# Patient Record
Sex: MALE | Race: BLACK OR AFRICAN AMERICAN | NOT HISPANIC OR LATINO | Employment: UNEMPLOYED | URBAN - METROPOLITAN AREA
[De-identification: names, ages, dates, MRNs, and addresses within clinical notes are randomized per-mention and may not be internally consistent; named-entity substitution may affect disease eponyms.]

---

## 2017-05-16 ENCOUNTER — HISTORICAL (OUTPATIENT)
Dept: ADMINISTRATIVE | Facility: HOSPITAL | Age: 57
End: 2017-05-16

## 2017-05-16 LAB
APPEARANCE, UA: CLEAR
BACTERIA #/AREA URNS AUTO: ABNORMAL /[HPF]
BILIRUB UR QL STRIP: NEGATIVE
CHOLEST SERPL-MCNC: 160 MG/DL
CHOLEST/HDLC SERPL: 3.5 {RATIO} (ref 0–5)
COLOR UR: YELLOW
GLUCOSE (UA): NORMAL
HAV IGM SERPL QL IA: NONREACTIVE
HBV CORE IGM SERPL QL IA: NONREACTIVE
HBV SURFACE AG SERPL QL IA: NEGATIVE
HCV AB SERPL QL IA: NONREACTIVE
HDLC SERPL-MCNC: 46 MG/DL
HGB UR QL STRIP: NEGATIVE
HIV 1+2 AB+HIV1 P24 AG SERPL QL IA: NONREACTIVE
HYALINE CASTS #/AREA URNS LPF: ABNORMAL /[LPF]
KETONES UR QL STRIP: NEGATIVE
LDLC SERPL CALC-MCNC: 95 MG/DL (ref 0–130)
LEUKOCYTE ESTERASE UR QL STRIP: NEGATIVE
NITRITE UR QL STRIP: NEGATIVE
PH UR STRIP: 5.5 [PH] (ref 4.5–8)
PROT UR QL STRIP: NEGATIVE
PSA SERPL-MCNC: 3.5 NG/ML (ref 0–4)
RBC #/AREA URNS AUTO: ABNORMAL /[HPF]
RPR SER QL: NORMAL
SP GR UR STRIP: 1.02 (ref 1–1.03)
SQUAMOUS #/AREA URNS LPF: ABNORMAL /[LPF]
TRIGL SERPL-MCNC: 94 MG/DL
TSH SERPL-ACNC: 0.66 MIU/L (ref 0.5–5)
UROBILINOGEN UR STRIP-ACNC: NORMAL
VLDLC SERPL CALC-MCNC: 19 MG/DL
WBC #/AREA URNS AUTO: ABNORMAL /HPF

## 2018-01-04 ENCOUNTER — HISTORICAL (OUTPATIENT)
Dept: RADIOLOGY | Facility: HOSPITAL | Age: 58
End: 2018-01-04

## 2018-07-12 ENCOUNTER — HISTORICAL (OUTPATIENT)
Dept: INTERNAL MEDICINE | Facility: CLINIC | Age: 58
End: 2018-07-12

## 2018-07-12 LAB
ABS NEUT (OLG): 7.34 X10(3)/MCL (ref 2.1–9.2)
ALBUMIN SERPL-MCNC: 3.8 GM/DL (ref 3.4–5)
ALBUMIN/GLOB SERPL: 1 RATIO (ref 1–2)
ALP SERPL-CCNC: 71 UNIT/L (ref 45–117)
ALT SERPL-CCNC: 28 UNIT/L (ref 12–78)
APPEARANCE, UA: CLEAR
AST SERPL-CCNC: 21 UNIT/L (ref 15–37)
BACTERIA #/AREA URNS AUTO: ABNORMAL /[HPF]
BASOPHILS # BLD AUTO: 0.12 X10(3)/MCL
BASOPHILS NFR BLD AUTO: 1 %
BILIRUB SERPL-MCNC: 0.3 MG/DL (ref 0.2–1)
BILIRUB UR QL STRIP: NEGATIVE
BILIRUBIN DIRECT+TOT PNL SERPL-MCNC: 0.1 MG/DL
BILIRUBIN DIRECT+TOT PNL SERPL-MCNC: 0.2 MG/DL
BUN SERPL-MCNC: 16 MG/DL (ref 7–18)
CALCIUM SERPL-MCNC: 8.6 MG/DL (ref 8.5–10.1)
CHLORIDE SERPL-SCNC: 110 MMOL/L (ref 98–107)
CHOLEST SERPL-MCNC: 142 MG/DL
CHOLEST/HDLC SERPL: 3.7 {RATIO} (ref 0–5)
CO2 SERPL-SCNC: 23 MMOL/L (ref 21–32)
COLOR UR: YELLOW
CREAT SERPL-MCNC: 1.1 MG/DL (ref 0.6–1.3)
EOSINOPHIL # BLD AUTO: 0.32 X10(3)/MCL
EOSINOPHIL NFR BLD AUTO: 2 %
ERYTHROCYTE [DISTWIDTH] IN BLOOD BY AUTOMATED COUNT: 15.2 % (ref 11.5–14.5)
GLOBULIN SER-MCNC: 3.4 GM/ML (ref 2.3–3.5)
GLUCOSE (UA): NORMAL
GLUCOSE SERPL-MCNC: 95 MG/DL (ref 74–106)
HCT VFR BLD AUTO: 42.4 % (ref 40–51)
HDLC SERPL-MCNC: 38 MG/DL
HGB BLD-MCNC: 14.1 GM/DL (ref 13.5–17.5)
HGB UR QL STRIP: NEGATIVE
HYALINE CASTS #/AREA URNS LPF: ABNORMAL /[LPF]
IMM GRANULOCYTES # BLD AUTO: 0.05 10*3/UL
IMM GRANULOCYTES NFR BLD AUTO: 0 %
KETONES UR QL STRIP: NEGATIVE
LDLC SERPL CALC-MCNC: 81 MG/DL (ref 0–130)
LEUKOCYTE ESTERASE UR QL STRIP: NEGATIVE
LYMPHOCYTES # BLD AUTO: 4.09 X10(3)/MCL
LYMPHOCYTES NFR BLD AUTO: 32 % (ref 13–40)
MCH RBC QN AUTO: 28.9 PG (ref 26–34)
MCHC RBC AUTO-ENTMCNC: 33.3 GM/DL (ref 31–37)
MCV RBC AUTO: 86.9 FL (ref 80–100)
MONOCYTES # BLD AUTO: 0.77 X10(3)/MCL
MONOCYTES NFR BLD AUTO: 6 % (ref 4–12)
NEUTROPHILS # BLD AUTO: 7.34 X10(3)/MCL
NEUTROPHILS NFR BLD AUTO: 58 X10(3)/MCL
NITRITE UR QL STRIP: NEGATIVE
PH UR STRIP: 6.5 [PH] (ref 4.5–8)
PLATELET # BLD AUTO: 138 X10(3)/MCL (ref 130–400)
PMV BLD AUTO: 12.5 FL (ref 7.4–10.4)
POTASSIUM SERPL-SCNC: 3.8 MMOL/L (ref 3.5–5.1)
PROT SERPL-MCNC: 7.2 GM/DL (ref 6.4–8.2)
PROT UR QL STRIP: NEGATIVE
PSA SERPL-MCNC: 5.7 NG/ML
RBC # BLD AUTO: 4.88 X10(6)/MCL (ref 4.5–5.9)
RBC #/AREA URNS AUTO: ABNORMAL /[HPF]
SODIUM SERPL-SCNC: 142 MMOL/L (ref 136–145)
SP GR UR STRIP: 1.02 (ref 1–1.03)
SQUAMOUS #/AREA URNS LPF: ABNORMAL /[LPF]
TRIGL SERPL-MCNC: 113 MG/DL
TSH SERPL-ACNC: 1.11 MIU/L (ref 0.36–3.74)
UROBILINOGEN UR STRIP-ACNC: 4 MG/DL
VLDLC SERPL CALC-MCNC: 23 MG/DL
WBC # SPEC AUTO: 12.7 X10(3)/MCL (ref 4.5–11)
WBC #/AREA URNS AUTO: ABNORMAL /HPF

## 2018-12-20 ENCOUNTER — HISTORICAL (OUTPATIENT)
Dept: ADMINISTRATIVE | Facility: HOSPITAL | Age: 58
End: 2018-12-20

## 2018-12-20 LAB
ABS NEUT (OLG): 6.35 X10(3)/MCL (ref 2.1–9.2)
ALBUMIN SERPL-MCNC: 3.8 GM/DL (ref 3.4–5)
ALBUMIN/GLOB SERPL: 1 RATIO (ref 1–2)
ALP SERPL-CCNC: 72 UNIT/L (ref 45–117)
ALT SERPL-CCNC: 27 UNIT/L (ref 12–78)
APPEARANCE, UA: CLEAR
AST SERPL-CCNC: 20 UNIT/L (ref 15–37)
BACTERIA #/AREA URNS AUTO: ABNORMAL /[HPF]
BASOPHILS # BLD AUTO: 0.12 X10(3)/MCL
BASOPHILS NFR BLD AUTO: 1 %
BILIRUB SERPL-MCNC: 0.4 MG/DL (ref 0.2–1)
BILIRUB UR QL STRIP: NEGATIVE
BILIRUBIN DIRECT+TOT PNL SERPL-MCNC: 0.1 MG/DL
BILIRUBIN DIRECT+TOT PNL SERPL-MCNC: 0.3 MG/DL
BUN SERPL-MCNC: 14 MG/DL (ref 7–18)
CALCIUM SERPL-MCNC: 8.2 MG/DL (ref 8.5–10.1)
CHLORIDE SERPL-SCNC: 104 MMOL/L (ref 98–107)
CK MB SERPL-MCNC: 3.9 NG/ML (ref 1–3.6)
CK SERPL-CCNC: 199 UNIT/L (ref 39–308)
CO2 SERPL-SCNC: 27 MMOL/L (ref 21–32)
COLOR UR: NORMAL
CREAT SERPL-MCNC: 1 MG/DL (ref 0.6–1.3)
EOSINOPHIL # BLD AUTO: 0.23 X10(3)/MCL
EOSINOPHIL NFR BLD AUTO: 2 %
ERYTHROCYTE [DISTWIDTH] IN BLOOD BY AUTOMATED COUNT: 15.9 % (ref 11.5–14.5)
EST. AVERAGE GLUCOSE BLD GHB EST-MCNC: 120 MG/DL
GLOBULIN SER-MCNC: 3.6 GM/ML (ref 2.3–3.5)
GLUCOSE (UA): NORMAL
GLUCOSE SERPL-MCNC: 90 MG/DL (ref 74–106)
HAV IGM SERPL QL IA: NONREACTIVE
HBA1C MFR BLD: 5.8 % (ref 4.2–6.3)
HBV CORE IGM SERPL QL IA: NONREACTIVE
HBV SURFACE AG SERPL QL IA: NEGATIVE
HCT VFR BLD AUTO: 44.7 % (ref 40–51)
HCV AB SERPL QL IA: NONREACTIVE
HGB BLD-MCNC: 14.8 GM/DL (ref 13.5–17.5)
HGB UR QL STRIP: NEGATIVE
HYALINE CASTS #/AREA URNS LPF: ABNORMAL /[LPF]
IMM GRANULOCYTES # BLD AUTO: 0.03 10*3/UL
IMM GRANULOCYTES NFR BLD AUTO: 0 %
KETONES UR QL STRIP: NEGATIVE
LEUKOCYTE ESTERASE UR QL STRIP: NEGATIVE
LYMPHOCYTES # BLD AUTO: 3.9 X10(3)/MCL
LYMPHOCYTES NFR BLD AUTO: 35 % (ref 13–40)
MAGNESIUM SERPL-MCNC: 2.3 MG/DL (ref 1.8–2.4)
MCH RBC QN AUTO: 28.5 PG (ref 26–34)
MCHC RBC AUTO-ENTMCNC: 33.1 GM/DL (ref 31–37)
MCV RBC AUTO: 86 FL (ref 80–100)
MONOCYTES # BLD AUTO: 0.59 X10(3)/MCL
MONOCYTES NFR BLD AUTO: 5 % (ref 4–12)
NEUTROPHILS # BLD AUTO: 6.35 X10(3)/MCL
NEUTROPHILS NFR BLD AUTO: 56 X10(3)/MCL
NITRITE UR QL STRIP: NEGATIVE
PH UR STRIP: 6 [PH] (ref 4.5–8)
PLATELET # BLD AUTO: 139 X10(3)/MCL (ref 130–400)
PMV BLD AUTO: 11.7 FL (ref 7.4–10.4)
POTASSIUM SERPL-SCNC: 3.8 MMOL/L (ref 3.5–5.1)
PROT SERPL-MCNC: 7.4 GM/DL (ref 6.4–8.2)
PROT UR QL STRIP: NEGATIVE
PSA SERPL-MCNC: 5 NG/ML
RBC # BLD AUTO: 5.2 X10(6)/MCL (ref 4.5–5.9)
RBC #/AREA URNS AUTO: ABNORMAL /[HPF]
SODIUM SERPL-SCNC: 138 MMOL/L (ref 136–145)
SP GR UR STRIP: 1.01 (ref 1–1.03)
SQUAMOUS #/AREA URNS LPF: ABNORMAL /[LPF]
TROPONIN I SERPL-MCNC: <0.015 NG/ML (ref 0–0.05)
UROBILINOGEN UR STRIP-ACNC: NORMAL
WBC # SPEC AUTO: 11.2 X10(3)/MCL (ref 4.5–11)
WBC #/AREA URNS AUTO: ABNORMAL /HPF

## 2019-01-17 ENCOUNTER — HISTORICAL (OUTPATIENT)
Dept: RADIOLOGY | Facility: HOSPITAL | Age: 59
End: 2019-01-17

## 2019-06-20 ENCOUNTER — HISTORICAL (OUTPATIENT)
Dept: ADMINISTRATIVE | Facility: HOSPITAL | Age: 59
End: 2019-06-20

## 2019-06-20 LAB
CRP SERPL-MCNC: 0.5 MG/DL
ERYTHROCYTE [SEDIMENTATION RATE] IN BLOOD: 2 MM/HR (ref 0–15)
RHEUMATOID FACT SERPL-ACNC: <10 IU/ML (ref 0–15)
VIT B12 SERPL-MCNC: 813 PG/ML (ref 193–986)

## 2019-08-20 ENCOUNTER — HISTORICAL (OUTPATIENT)
Dept: ENDOSCOPY | Facility: HOSPITAL | Age: 59
End: 2019-08-20

## 2019-08-23 ENCOUNTER — HISTORICAL (OUTPATIENT)
Dept: ADMINISTRATIVE | Facility: HOSPITAL | Age: 59
End: 2019-08-23

## 2019-08-23 LAB
ABS NEUT (OLG): 16.37 X10(3)/MCL
ANISOCYTOSIS BLD QL SMEAR: ABNORMAL
BASOPHILS NFR BLD MANUAL: 0 %
BILIRUB SERPL-MCNC: NORMAL MG/DL
BLOOD URINE, POC: NORMAL
CLARITY, POC UA: CLEAR
COLOR, POC UA: NORMAL
EOSINOPHIL NFR BLD MANUAL: 0 %
ERYTHROCYTE [DISTWIDTH] IN BLOOD BY AUTOMATED COUNT: 15.3 % (ref 11.5–14.5)
GLUCOSE UR QL STRIP: NEGATIVE
GRANULOCYTES NFR BLD MANUAL: 63 % (ref 43–75)
HCT VFR BLD AUTO: 46.4 % (ref 40–51)
HGB BLD-MCNC: 15.3 GM/DL (ref 13.5–17.5)
KETONES UR QL STRIP: NORMAL
LEUKOCYTE EST, POC UA: NEGATIVE
LYMPHOCYTES NFR BLD MANUAL: 17 % (ref 20.5–51.1)
LYMPHOCYTES NFR BLD MANUAL: 6 %
MACROCYTES BLD QL SMEAR: ABNORMAL
MCH RBC QN AUTO: 29.2 PG (ref 26–34)
MCHC RBC AUTO-ENTMCNC: 33 GM/DL (ref 31–37)
MCV RBC AUTO: 88.5 FL (ref 80–100)
MONOCYTES NFR BLD MANUAL: 6 % (ref 2–9)
NEUTS BAND NFR BLD MANUAL: 8 % (ref 0–10)
NITRITE, POC UA: POSITIVE
PH, POC UA: 5.5
PLATELET # BLD AUTO: 134 X10(3)/MCL (ref 130–400)
PLATELET # BLD EST: ADEQUATE 10*3/UL
PMV BLD AUTO: 12.4 FL (ref 7.4–10.4)
POLYCHROMASIA BLD QL SMEAR: ABNORMAL
PROTEIN, POC: NORMAL
RBC # BLD AUTO: 5.24 X10(6)/MCL (ref 4.5–5.9)
RBC MORPH BLD: ABNORMAL
SPECIFIC GRAVITY, POC UA: NORMAL
UROBILINOGEN, POC UA: NORMAL
WBC # SPEC AUTO: 21.9 X10(3)/MCL (ref 4.5–11)

## 2019-08-25 LAB — FINAL CULTURE: NO GROWTH

## 2019-09-30 ENCOUNTER — HISTORICAL (OUTPATIENT)
Dept: ADMINISTRATIVE | Facility: HOSPITAL | Age: 59
End: 2019-09-30

## 2019-09-30 LAB
APPEARANCE, UA: CLEAR
BACTERIA #/AREA URNS AUTO: ABNORMAL /[HPF]
BILIRUB UR QL STRIP: NEGATIVE
COLOR UR: YELLOW
GLUCOSE (UA): NORMAL
HGB UR QL STRIP: NEGATIVE
HYALINE CASTS #/AREA URNS LPF: ABNORMAL /[LPF]
KETONES UR QL STRIP: NEGATIVE
LEUKOCYTE ESTERASE UR QL STRIP: 25 LEU/UL
NITRITE UR QL STRIP: NEGATIVE
PH UR STRIP: 5.5 [PH] (ref 4.5–8)
PROT UR QL STRIP: 10 MG/DL
RBC #/AREA URNS AUTO: ABNORMAL /[HPF]
SP GR UR STRIP: 1.02 (ref 1–1.03)
SQUAMOUS #/AREA URNS LPF: ABNORMAL /[LPF]
UROBILINOGEN UR STRIP-ACNC: NORMAL
WBC #/AREA URNS AUTO: ABNORMAL /HPF

## 2019-10-03 LAB — FINAL CULTURE: NO GROWTH

## 2019-10-14 ENCOUNTER — HISTORICAL (OUTPATIENT)
Dept: RADIOLOGY | Facility: HOSPITAL | Age: 59
End: 2019-10-14

## 2019-11-15 ENCOUNTER — HISTORICAL (OUTPATIENT)
Dept: ADMINISTRATIVE | Facility: HOSPITAL | Age: 59
End: 2019-11-15

## 2019-11-25 ENCOUNTER — HISTORICAL (OUTPATIENT)
Dept: INTERNAL MEDICINE | Facility: CLINIC | Age: 59
End: 2019-11-25

## 2019-11-27 ENCOUNTER — HISTORICAL (OUTPATIENT)
Dept: ADMINISTRATIVE | Facility: HOSPITAL | Age: 59
End: 2019-11-27

## 2020-05-28 ENCOUNTER — HISTORICAL (OUTPATIENT)
Dept: INTERNAL MEDICINE | Facility: CLINIC | Age: 60
End: 2020-05-28

## 2020-05-28 LAB
ABS NEUT (OLG): 7.11 X10(3)/MCL (ref 2.1–9.2)
ALBUMIN SERPL-MCNC: 3.5 GM/DL (ref 3.4–5)
ALBUMIN/GLOB SERPL: 1 RATIO (ref 1.1–2)
ALP SERPL-CCNC: 70 UNIT/L (ref 45–117)
ALT SERPL-CCNC: 30 UNIT/L (ref 12–78)
AMPHET UR QL SCN: NEGATIVE
ANISOCYTOSIS BLD QL SMEAR: NORMAL
AST SERPL-CCNC: 19 UNIT/L (ref 15–37)
BARBITURATE SCN PRESENT UR: NEGATIVE
BASOPHILS NFR BLD MANUAL: 0 %
BENZODIAZ UR QL SCN: NEGATIVE
BILIRUB SERPL-MCNC: 0.2 MG/DL (ref 0.2–1)
BILIRUBIN DIRECT+TOT PNL SERPL-MCNC: <0.1 MG/DL (ref 0–0.2)
BILIRUBIN DIRECT+TOT PNL SERPL-MCNC: ABNORMAL MG/DL
BUN SERPL-MCNC: 18 MG/DL (ref 7–18)
CALCIUM SERPL-MCNC: 8.7 MG/DL (ref 8.5–10.1)
CANNABINOIDS UR QL SCN: NEGATIVE
CHLORIDE SERPL-SCNC: 110 MMOL/L (ref 98–107)
CHOLEST SERPL-MCNC: 152 MG/DL
CHOLEST/HDLC SERPL: 4.1 {RATIO} (ref 0–5)
CO2 SERPL-SCNC: 26 MMOL/L (ref 21–32)
COCAINE UR QL SCN: POSITIVE
CREAT SERPL-MCNC: 1.2 MG/DL (ref 0.6–1.3)
DEPRECATED CALCIDIOL+CALCIFEROL SERPL-MC: 27.2 NG/ML (ref 30–80)
EOSINOPHIL NFR BLD MANUAL: 0 %
ERYTHROCYTE [DISTWIDTH] IN BLOOD BY AUTOMATED COUNT: 16.7 % (ref 11.5–14.5)
EST. AVERAGE GLUCOSE BLD GHB EST-MCNC: 111 MG/DL
GLOBULIN SER-MCNC: 3.6 GM/ML (ref 2.3–3.5)
GLUCOSE SERPL-MCNC: 122 MG/DL (ref 74–106)
GRANULOCYTES NFR BLD MANUAL: 67 % (ref 43–75)
HBA1C MFR BLD: 5.5 % (ref 4.2–6.3)
HCT VFR BLD AUTO: 45.3 % (ref 40–51)
HDLC SERPL-MCNC: 37 MG/DL (ref 40–59)
HGB BLD-MCNC: 14.8 GM/DL (ref 13.5–17.5)
LDLC SERPL CALC-MCNC: 64 MG/DL
LYMPHOCYTES NFR BLD MANUAL: 29 % (ref 20.5–51.1)
MACROCYTES BLD QL SMEAR: NORMAL
MCH RBC QN AUTO: 28.1 PG (ref 26–34)
MCHC RBC AUTO-ENTMCNC: 32.7 GM/DL (ref 31–37)
MCV RBC AUTO: 86.1 FL (ref 80–100)
MONOCYTES NFR BLD MANUAL: 4 % (ref 2–9)
OPIATES UR QL SCN: NEGATIVE
PCP UR QL: NEGATIVE
PH UR STRIP.AUTO: 6 [PH] (ref 5–8)
PLATELET # BLD AUTO: 140 X10(3)/MCL (ref 130–400)
PLATELET # BLD EST: NORMAL 10*3/UL
PMV BLD AUTO: ABNORMAL FL (ref 7.4–10.4)
POTASSIUM SERPL-SCNC: 3.8 MMOL/L (ref 3.5–5.1)
PROT SERPL-MCNC: 7.1 GM/DL (ref 6.4–8.2)
RBC # BLD AUTO: 5.26 X10(6)/MCL (ref 4.5–5.9)
RBC MORPH BLD: NORMAL
SODIUM SERPL-SCNC: 141 MMOL/L (ref 136–145)
TEMPERATURE, URINE (OHS): 25 DEGC (ref 20–25)
TRIGL SERPL-MCNC: 253 MG/DL
TSH SERPL-ACNC: 1.34 MIU/L (ref 0.36–3.74)
VLDLC SERPL CALC-MCNC: 51 MG/DL
WBC # SPEC AUTO: 13.7 X10(3)/MCL (ref 4.5–11)

## 2020-07-01 ENCOUNTER — HISTORICAL (OUTPATIENT)
Dept: RESPIRATORY THERAPY | Facility: HOSPITAL | Age: 60
End: 2020-07-01

## 2020-07-06 ENCOUNTER — HISTORICAL (OUTPATIENT)
Dept: ADMINISTRATIVE | Facility: HOSPITAL | Age: 60
End: 2020-07-06

## 2020-07-06 LAB — PSA SERPL-MCNC: <0.1 NG/ML

## 2020-09-22 ENCOUNTER — HISTORICAL (OUTPATIENT)
Dept: LAB | Facility: HOSPITAL | Age: 60
End: 2020-09-22

## 2020-09-22 LAB
ABS NEUT (OLG): 6.73 X10(3)/MCL (ref 2.1–9.2)
AMPHET UR QL SCN: NEGATIVE
ANISOCYTOSIS BLD QL SMEAR: ABNORMAL
BARBITURATE SCN PRESENT UR: NEGATIVE
BASOPHILS NFR BLD MANUAL: 0 %
BENZODIAZ UR QL SCN: NEGATIVE
BURR CELLS BLD QL SMEAR: ABNORMAL
CANNABINOIDS UR QL SCN: NEGATIVE
COCAINE UR QL SCN: POSITIVE
EOSINOPHIL NFR BLD MANUAL: 0 %
ERYTHROCYTE [DISTWIDTH] IN BLOOD BY AUTOMATED COUNT: 15.6 % (ref 11.5–14.5)
GRANULOCYTES NFR BLD MANUAL: 57 % (ref 43–75)
HCT VFR BLD AUTO: 43.2 % (ref 40–51)
HGB BLD-MCNC: 14.1 GM/DL (ref 13.5–17.5)
LYMPHOCYTES NFR BLD MANUAL: 30 % (ref 20.5–51.1)
LYMPHOCYTES NFR BLD MANUAL: 6 %
MCH RBC QN AUTO: 28.3 PG (ref 26–34)
MCHC RBC AUTO-ENTMCNC: 32.6 GM/DL (ref 31–37)
MCV RBC AUTO: 86.7 FL (ref 80–100)
MONOCYTES NFR BLD MANUAL: 7 % (ref 2–9)
OPIATES UR QL SCN: NEGATIVE
PCP UR QL: NEGATIVE
PH UR STRIP.AUTO: 5.5 [PH] (ref 5–8)
PLATELET # BLD AUTO: 153 X10(3)/MCL (ref 130–400)
PLATELET # BLD EST: ADEQUATE 10*3/UL
PMV BLD AUTO: 12 FL (ref 7.4–10.4)
POIKILOCYTOSIS BLD QL SMEAR: ABNORMAL
POLYCHROMASIA BLD QL SMEAR: ABNORMAL
RBC # BLD AUTO: 4.98 X10(6)/MCL (ref 4.5–5.9)
RBC MORPH BLD: ABNORMAL
TEMPERATURE, URINE (OHS): 25 DEGC (ref 20–25)
WBC # SPEC AUTO: 12.4 X10(3)/MCL (ref 4.5–11)

## 2020-12-08 ENCOUNTER — HISTORICAL (OUTPATIENT)
Dept: ADMINISTRATIVE | Facility: HOSPITAL | Age: 60
End: 2020-12-08

## 2020-12-08 LAB — SARS-COV-2 RNA RESP QL NAA+PROBE: NOT DETECTED

## 2021-03-24 ENCOUNTER — HISTORICAL (OUTPATIENT)
Dept: INTERNAL MEDICINE | Facility: CLINIC | Age: 61
End: 2021-03-24

## 2021-03-24 LAB
ABS NEUT (OLG): 8.94 X10(3)/MCL (ref 2.1–9.2)
ALBUMIN SERPL-MCNC: 4.2 GM/DL (ref 3.5–5)
ALBUMIN/GLOB SERPL: 1.3 RATIO (ref 1.1–2)
ALP SERPL-CCNC: 75 UNIT/L (ref 40–150)
ALT SERPL-CCNC: 19 UNIT/L (ref 0–55)
AST SERPL-CCNC: 17 UNIT/L (ref 5–34)
BASOPHILS # BLD AUTO: 0.1 X10(3)/MCL (ref 0–0.2)
BASOPHILS NFR BLD AUTO: 1 %
BILIRUB SERPL-MCNC: 0.6 MG/DL
BILIRUBIN DIRECT+TOT PNL SERPL-MCNC: 0.2 MG/DL (ref 0–0.5)
BILIRUBIN DIRECT+TOT PNL SERPL-MCNC: 0.4 MG/DL (ref 0–0.8)
BUN SERPL-MCNC: 10.7 MG/DL (ref 8.4–25.7)
CALCIUM SERPL-MCNC: 9 MG/DL (ref 8.4–10.2)
CHLORIDE SERPL-SCNC: 104 MMOL/L (ref 98–107)
CHOLEST SERPL-MCNC: 151 MG/DL
CHOLEST/HDLC SERPL: 4 {RATIO} (ref 0–5)
CO2 SERPL-SCNC: 24 MMOL/L (ref 22–29)
CREAT SERPL-MCNC: 0.92 MG/DL (ref 0.73–1.18)
CREAT UR-MCNC: 45.3 MG/DL (ref 58–161)
DEPRECATED CALCIDIOL+CALCIFEROL SERPL-MC: 39.2 NG/ML (ref 30–80)
EOSINOPHIL # BLD AUTO: 0.2 X10(3)/MCL (ref 0–0.9)
EOSINOPHIL NFR BLD AUTO: 1 %
ERYTHROCYTE [DISTWIDTH] IN BLOOD BY AUTOMATED COUNT: 15.8 % (ref 11.5–14.5)
EST. AVERAGE GLUCOSE BLD GHB EST-MCNC: 105.4 MG/DL
FOLATE SERPL-MCNC: 11.6 NG/ML (ref 7–31.4)
GLOBULIN SER-MCNC: 3.3 GM/DL (ref 2.4–3.5)
GLUCOSE SERPL-MCNC: 111 MG/DL (ref 74–100)
HAV IGM SERPL QL IA: NONREACTIVE
HBA1C MFR BLD: 5.3 %
HBV CORE IGM SERPL QL IA: NONREACTIVE
HBV SURFACE AG SERPL QL IA: NONREACTIVE
HCT VFR BLD AUTO: 44.6 % (ref 40–51)
HCV AB SERPL QL IA: NONREACTIVE
HDLC SERPL-MCNC: 40 MG/DL (ref 35–60)
HGB BLD-MCNC: 14.8 GM/DL (ref 13.5–17.5)
HIV 1+2 AB+HIV1 P24 AG SERPL QL IA: NONREACTIVE
IMM GRANULOCYTES # BLD AUTO: 0.05 10*3/UL
IMM GRANULOCYTES NFR BLD AUTO: 0 %
LDLC SERPL CALC-MCNC: 90 MG/DL (ref 50–140)
LYMPHOCYTES # BLD AUTO: 3.4 X10(3)/MCL (ref 0.6–4.6)
LYMPHOCYTES NFR BLD AUTO: 26 %
MCH RBC QN AUTO: 28.5 PG (ref 26–34)
MCHC RBC AUTO-ENTMCNC: 33.2 GM/DL (ref 31–37)
MCV RBC AUTO: 85.8 FL (ref 80–100)
MICROALBUMIN UR-MCNC: 7 MG/L
MICROALBUMIN/CREAT RATIO PNL UR: 15.5 MG/GM CR (ref 0–30)
MONOCYTES # BLD AUTO: 0.7 X10(3)/MCL (ref 0.1–1.3)
MONOCYTES NFR BLD AUTO: 5 %
NEUTROPHILS # BLD AUTO: 8.94 X10(3)/MCL (ref 2.1–9.2)
NEUTROPHILS NFR BLD AUTO: 67 %
PLATELET # BLD AUTO: 160 X10(3)/MCL (ref 130–400)
PMV BLD AUTO: 11.9 FL (ref 7.4–10.4)
POTASSIUM SERPL-SCNC: 3.4 MMOL/L (ref 3.5–5.1)
PROT SERPL-MCNC: 7.5 GM/DL (ref 6.4–8.3)
RBC # BLD AUTO: 5.2 X10(6)/MCL (ref 4.5–5.9)
SODIUM SERPL-SCNC: 137 MMOL/L (ref 136–145)
T PALLIDUM AB SER QL: NONREACTIVE
TRIGL SERPL-MCNC: 104 MG/DL (ref 34–140)
VIT B12 SERPL-MCNC: 876 PG/ML (ref 213–816)
VLDLC SERPL CALC-MCNC: 21 MG/DL
WBC # SPEC AUTO: 13.4 X10(3)/MCL (ref 4.5–11)

## 2021-03-25 ENCOUNTER — HISTORICAL (OUTPATIENT)
Dept: ADMINISTRATIVE | Facility: HOSPITAL | Age: 61
End: 2021-03-25

## 2021-08-27 ENCOUNTER — HISTORICAL (OUTPATIENT)
Dept: WOUND CARE | Facility: HOSPITAL | Age: 61
End: 2021-08-27

## 2021-08-27 LAB — PSA SERPL-MCNC: <0.02 NG/ML

## 2021-10-19 ENCOUNTER — HISTORICAL (OUTPATIENT)
Dept: INTERNAL MEDICINE | Facility: CLINIC | Age: 61
End: 2021-10-19

## 2021-10-19 LAB
ABS NEUT (OLG): 6.01 X10(3)/MCL (ref 2.1–9.2)
ALBUMIN SERPL-MCNC: 3.9 GM/DL (ref 3.5–5)
ALBUMIN/GLOB SERPL: 1.2 RATIO (ref 1.1–2)
ALP SERPL-CCNC: 65 UNIT/L (ref 40–150)
ALT SERPL-CCNC: 17 UNIT/L (ref 0–55)
APPEARANCE, UA: CLEAR
AST SERPL-CCNC: 16 UNIT/L (ref 5–34)
BACTERIA #/AREA URNS AUTO: ABNORMAL /HPF
BASOPHILS # BLD AUTO: 0.1 X10(3)/MCL (ref 0–0.2)
BASOPHILS NFR BLD AUTO: 1 %
BILIRUB SERPL-MCNC: 0.4 MG/DL
BILIRUB UR QL STRIP: NEGATIVE
BILIRUBIN DIRECT+TOT PNL SERPL-MCNC: 0.1 MG/DL (ref 0–0.5)
BILIRUBIN DIRECT+TOT PNL SERPL-MCNC: 0.3 MG/DL (ref 0–0.8)
BUN SERPL-MCNC: 13.6 MG/DL (ref 8.4–25.7)
CALCIUM SERPL-MCNC: 9.4 MG/DL (ref 8.4–10.2)
CHLORIDE SERPL-SCNC: 108 MMOL/L (ref 98–107)
CHOLEST SERPL-MCNC: 169 MG/DL
CHOLEST/HDLC SERPL: 5 {RATIO} (ref 0–5)
CO2 SERPL-SCNC: 23 MMOL/L (ref 22–29)
COLOR UR: YELLOW
CREAT SERPL-MCNC: 0.91 MG/DL (ref 0.73–1.18)
DEPRECATED CALCIDIOL+CALCIFEROL SERPL-MC: 32.9 NG/ML (ref 30–80)
EOSINOPHIL # BLD AUTO: 0.2 X10(3)/MCL (ref 0–0.9)
EOSINOPHIL NFR BLD AUTO: 2 %
ERYTHROCYTE [DISTWIDTH] IN BLOOD BY AUTOMATED COUNT: 15.8 % (ref 11.5–14.5)
EST. AVERAGE GLUCOSE BLD GHB EST-MCNC: 114 MG/DL
GLOBULIN SER-MCNC: 3.3 GM/DL (ref 2.4–3.5)
GLUCOSE (UA): NEGATIVE
GLUCOSE SERPL-MCNC: 99 MG/DL (ref 74–100)
HBA1C MFR BLD: 5.6 %
HCT VFR BLD AUTO: 45.8 % (ref 40–51)
HDLC SERPL-MCNC: 36 MG/DL (ref 35–60)
HGB BLD-MCNC: 14.8 GM/DL (ref 13.5–17.5)
HGB UR QL STRIP: NEGATIVE
HYALINE CASTS #/AREA URNS LPF: ABNORMAL /LPF
IMM GRANULOCYTES # BLD AUTO: 0.03 10*3/UL
IMM GRANULOCYTES NFR BLD AUTO: 0 %
KETONES UR QL STRIP: NEGATIVE
LDLC SERPL CALC-MCNC: 114 MG/DL (ref 50–140)
LEUKOCYTE ESTERASE UR QL STRIP: NEGATIVE
LYMPHOCYTES # BLD AUTO: 3.1 X10(3)/MCL (ref 0.6–4.6)
LYMPHOCYTES NFR BLD AUTO: 31 %
MCH RBC QN AUTO: 28.6 PG (ref 26–34)
MCHC RBC AUTO-ENTMCNC: 32.3 GM/DL (ref 31–37)
MCV RBC AUTO: 88.6 FL (ref 80–100)
MONOCYTES # BLD AUTO: 0.7 X10(3)/MCL (ref 0.1–1.3)
MONOCYTES NFR BLD AUTO: 7 %
NEUTROPHILS # BLD AUTO: 6.01 X10(3)/MCL (ref 2.1–9.2)
NEUTROPHILS NFR BLD AUTO: 59 %
NITRITE UR QL STRIP: NEGATIVE
NRBC BLD AUTO-RTO: 0 % (ref 0–0.2)
PH UR STRIP: 6 [PH] (ref 4.5–8)
PLATELET # BLD AUTO: 162 X10(3)/MCL (ref 130–400)
PMV BLD AUTO: 12.6 FL (ref 7.4–10.4)
POTASSIUM SERPL-SCNC: 4.1 MMOL/L (ref 3.5–5.1)
PROT SERPL-MCNC: 7.2 GM/DL (ref 6.4–8.3)
PROT UR QL STRIP: NEGATIVE
RBC # BLD AUTO: 5.17 X10(6)/MCL (ref 4.5–5.9)
RBC #/AREA URNS AUTO: ABNORMAL /HPF
SODIUM SERPL-SCNC: 139 MMOL/L (ref 136–145)
SP GR UR STRIP: 1.01 (ref 1–1.03)
SQUAMOUS #/AREA URNS LPF: >100 /LPF
T4 FREE SERPL-MCNC: 0.88 NG/DL (ref 0.7–1.48)
TRIGL SERPL-MCNC: 95 MG/DL (ref 34–140)
TSH SERPL-ACNC: 1.15 UIU/ML (ref 0.35–4.94)
UROBILINOGEN UR STRIP-ACNC: NORMAL
VLDLC SERPL CALC-MCNC: 19 MG/DL
WBC # SPEC AUTO: 10.2 X10(3)/MCL (ref 4.5–11)
WBC #/AREA URNS AUTO: ABNORMAL /HPF

## 2021-10-28 ENCOUNTER — HOSPITAL ENCOUNTER (EMERGENCY)
Facility: HOSPITAL | Age: 61
Discharge: HOME | End: 2021-10-28
Attending: EMERGENCY MEDICINE
Payer: MEDICAID

## 2021-10-28 VITALS
TEMPERATURE: 99 F | OXYGEN SATURATION: 96 % | RESPIRATION RATE: 64 BRPM | WEIGHT: 183 LBS | DIASTOLIC BLOOD PRESSURE: 86 MMHG | BODY MASS INDEX: 27.11 KG/M2 | SYSTOLIC BLOOD PRESSURE: 143 MMHG | HEART RATE: 64 BPM | HEIGHT: 69 IN

## 2021-10-28 DIAGNOSIS — R07.9 CHEST PAIN: ICD-10-CM

## 2021-10-28 DIAGNOSIS — M54.9 BACK PAIN, UNSPECIFIED BACK LOCATION, UNSPECIFIED BACK PAIN LATERALITY, UNSPECIFIED CHRONICITY: ICD-10-CM

## 2021-10-28 DIAGNOSIS — M62.838 MUSCLE SPASM: ICD-10-CM

## 2021-10-28 DIAGNOSIS — R07.9 CHEST PAIN, UNSPECIFIED TYPE: Primary | ICD-10-CM

## 2021-10-28 LAB
ALBUMIN SERPL BCP-MCNC: 4.2 G/DL (ref 3.5–5.2)
ALP SERPL-CCNC: 74 U/L (ref 55–135)
ALT SERPL W/O P-5'-P-CCNC: 18 U/L (ref 10–44)
ANION GAP SERPL CALC-SCNC: 9 MMOL/L (ref 8–16)
ANISOCYTOSIS BLD QL SMEAR: SLIGHT
AST SERPL-CCNC: 16 U/L (ref 10–40)
BASOPHILS NFR BLD: 3 % (ref 0–1.9)
BILIRUB SERPL-MCNC: 0.5 MG/DL (ref 0.1–1)
BILIRUB UR QL STRIP: NEGATIVE
BNP SERPL-MCNC: <10 PG/ML (ref 0–99)
BUN SERPL-MCNC: 15 MG/DL (ref 6–20)
CALCIUM SERPL-MCNC: 9.9 MG/DL (ref 8.7–10.5)
CHLORIDE SERPL-SCNC: 106 MMOL/L (ref 95–110)
CLARITY UR: CLEAR
CO2 SERPL-SCNC: 25 MMOL/L (ref 23–29)
COLOR UR: YELLOW
CREAT SERPL-MCNC: 1.2 MG/DL (ref 0.5–1.4)
D DIMER PPP IA.FEU-MCNC: <0.19 MG/L FEU
DIFFERENTIAL METHOD: ABNORMAL
EOSINOPHIL NFR BLD: 4 % (ref 0–8)
ERYTHROCYTE [DISTWIDTH] IN BLOOD BY AUTOMATED COUNT: 15.9 % (ref 11.5–14.5)
EST. GFR  (AFRICAN AMERICAN): >60 ML/MIN/1.73 M^2
EST. GFR  (NON AFRICAN AMERICAN): >60 ML/MIN/1.73 M^2
GLUCOSE SERPL-MCNC: 85 MG/DL (ref 70–110)
GLUCOSE UR QL STRIP: NEGATIVE
HCT VFR BLD AUTO: 48.7 % (ref 40–54)
HGB BLD-MCNC: 16.1 G/DL (ref 14–18)
HGB UR QL STRIP: NEGATIVE
IMM GRANULOCYTES # BLD AUTO: ABNORMAL K/UL (ref 0–0.04)
IMM GRANULOCYTES NFR BLD AUTO: ABNORMAL % (ref 0–0.5)
KETONES UR QL STRIP: NEGATIVE
LEUKOCYTE ESTERASE UR QL STRIP: NEGATIVE
LYMPHOCYTES NFR BLD: 27 % (ref 18–48)
MCH RBC QN AUTO: 28.7 PG (ref 27–31)
MCHC RBC AUTO-ENTMCNC: 33.1 G/DL (ref 32–36)
MCV RBC AUTO: 87 FL (ref 82–98)
MONOCYTES NFR BLD: 5 % (ref 4–15)
NEUTROPHILS NFR BLD: 61 % (ref 38–73)
NITRITE UR QL STRIP: NEGATIVE
NRBC BLD-RTO: 0 /100 WBC
PH UR STRIP: 6 [PH] (ref 5–8)
PLATELET # BLD AUTO: 168 K/UL (ref 150–450)
PLATELET BLD QL SMEAR: ABNORMAL
PMV BLD AUTO: 12.7 FL (ref 9.2–12.9)
POLYCHROMASIA BLD QL SMEAR: ABNORMAL
POTASSIUM SERPL-SCNC: 4.1 MMOL/L (ref 3.5–5.1)
PROT SERPL-MCNC: 7.8 G/DL (ref 6–8.4)
PROT UR QL STRIP: NEGATIVE
RBC # BLD AUTO: 5.61 M/UL (ref 4.6–6.2)
SODIUM SERPL-SCNC: 140 MMOL/L (ref 136–145)
SP GR UR STRIP: 1.02 (ref 1–1.03)
TROPONIN I SERPL DL<=0.01 NG/ML-MCNC: 0.02 NG/ML (ref 0–0.03)
URN SPEC COLLECT METH UR: NORMAL
UROBILINOGEN UR STRIP-ACNC: NEGATIVE EU/DL
WBC # BLD AUTO: 11.25 K/UL (ref 3.9–12.7)

## 2021-10-28 PROCEDURE — 93010 ELECTROCARDIOGRAM REPORT: CPT | Mod: ,,, | Performed by: INTERNAL MEDICINE

## 2021-10-28 PROCEDURE — 96372 THER/PROPH/DIAG INJ SC/IM: CPT

## 2021-10-28 PROCEDURE — 81003 URINALYSIS AUTO W/O SCOPE: CPT | Performed by: EMERGENCY MEDICINE

## 2021-10-28 PROCEDURE — 99285 EMERGENCY DEPT VISIT HI MDM: CPT | Mod: 25

## 2021-10-28 PROCEDURE — 80053 COMPREHEN METABOLIC PANEL: CPT | Performed by: PHYSICIAN ASSISTANT

## 2021-10-28 PROCEDURE — 63600175 PHARM REV CODE 636 W HCPCS: Performed by: NURSE PRACTITIONER

## 2021-10-28 PROCEDURE — 85007 BL SMEAR W/DIFF WBC COUNT: CPT | Performed by: PHYSICIAN ASSISTANT

## 2021-10-28 PROCEDURE — 93005 ELECTROCARDIOGRAM TRACING: CPT

## 2021-10-28 PROCEDURE — 85027 COMPLETE CBC AUTOMATED: CPT | Performed by: PHYSICIAN ASSISTANT

## 2021-10-28 PROCEDURE — 85379 FIBRIN DEGRADATION QUANT: CPT | Performed by: EMERGENCY MEDICINE

## 2021-10-28 PROCEDURE — 84484 ASSAY OF TROPONIN QUANT: CPT | Performed by: PHYSICIAN ASSISTANT

## 2021-10-28 PROCEDURE — 93010 EKG 12-LEAD: ICD-10-PCS | Mod: ,,, | Performed by: INTERNAL MEDICINE

## 2021-10-28 PROCEDURE — 83880 ASSAY OF NATRIURETIC PEPTIDE: CPT | Performed by: PHYSICIAN ASSISTANT

## 2021-10-28 RX ORDER — NAPROXEN 500 MG/1
500 TABLET ORAL 2 TIMES DAILY WITH MEALS
Qty: 30 TABLET | Refills: 0 | Status: SHIPPED | OUTPATIENT
Start: 2021-10-28 | End: 2021-11-12

## 2021-10-28 RX ORDER — ASPIRIN 325 MG
325 TABLET ORAL
Status: DISCONTINUED | OUTPATIENT
Start: 2021-10-28 | End: 2021-10-29 | Stop reason: HOSPADM

## 2021-10-28 RX ORDER — ORPHENADRINE CITRATE 30 MG/ML
30 INJECTION INTRAMUSCULAR; INTRAVENOUS
Status: COMPLETED | OUTPATIENT
Start: 2021-10-28 | End: 2021-10-28

## 2021-10-28 RX ORDER — CYCLOBENZAPRINE HCL 10 MG
10 TABLET ORAL 3 TIMES DAILY PRN
Qty: 15 TABLET | Refills: 0 | Status: SHIPPED | OUTPATIENT
Start: 2021-10-28 | End: 2021-11-02

## 2021-10-28 RX ADMIN — ORPHENADRINE CITRATE 30 MG: 30 INJECTION INTRAMUSCULAR; INTRAVENOUS at 07:10

## 2022-04-10 ENCOUNTER — HISTORICAL (OUTPATIENT)
Dept: ADMINISTRATIVE | Facility: HOSPITAL | Age: 62
End: 2022-04-10
Payer: MEDICAID

## 2022-04-28 VITALS
SYSTOLIC BLOOD PRESSURE: 129 MMHG | BODY MASS INDEX: 26.91 KG/M2 | DIASTOLIC BLOOD PRESSURE: 85 MMHG | HEIGHT: 69 IN | OXYGEN SATURATION: 97 % | WEIGHT: 181.69 LBS

## 2022-04-30 NOTE — OP NOTE
DATE OF SURGERY:    08/20/2019    SURGEON:  Chuck Rudolph MD    attending physician:  Dr. Chuck Rudolph MD    PREOPERATIVE DIAGNOSIS:  Elevated PSA.    POSTOPERATIVE DIAGNOSIS:  Elevated PSA.    PROCEDURE:    1. Prostate ultrasound.  2. Ultrasound-guided biopsy.   3. Prostate biopsy.    ANESTHESIA:  General.    COMPLICATIONS:  None.    BLOOD LOSS:  None.    FINDINGS:  21 cc gland, normal architecture.  The patient tolerated the procedure well.    INDICATIONS:  A 59-year-old male with elevated PSA of 5.7, comes in for prostate biopsy.  He is aware of his prostate cancer approximately 30%.  Risks of the procedure were discussed including bleeding, infection, failure to diagnose among others.    DESCRIPTION OF PROCEDURE:  He was taken to the ultrasound suite, underwent general anesthesia, placed with his right flank up.  8 megahertz ultrasound probe was inserted.  His prostate measured 21 cc at mid gland.  Seminal vesicles were nondilated.  There were no suspicious hyper or hypoechoic areas noted.  Sagittal sectioning was carried out. He underwent biopsies in sextant fashion, 2 in each base in the mid apex.  He tolerated the procedure well with no bleeding.  He was given a prescription for Cipro.  I will see     him in [facility] in 1 week, sooner should he have problems.  He is aware should he have a temperature of 101, he is to report to the emergency room for IV antibiotic therapy.        ______________________________  Chuck Rudolph MD    TAB/MODL  DD:  08/20/2019  Time:  11:13AM  DT:  08/20/2019  Time:  11:21AM  Job #:  [id number]

## 2022-05-04 NOTE — HISTORICAL [facility] CERNER
This is a historical note converted from Lala. Formatting and pictures may have been removed.  Please reference Lala for original formatting and attached multimedia. Procedure Name  ?  ?  11/27/2019 09:11:40  ?  Procedure:?Left ?Knee? aspiration and injection  Indications: Therapeutic Indication - decrease pain, increase range of motion and improve quality of life  RISKS: Possible complications with injection include bleeding, infection (0.01%), tendon rupture, steroid flare, fat pad or soft tissue atrophy, skin depigmentation, and vasovagal response. (Steroid flair treatment is rest, ice, NSAIDs and resolves in 24-36 hours.)  Consent: Procedure, risks, benefits, & alternatives explained to patient, who voiced understanding and agreed to proceed with procedure. Consent signed and scanned into the medical record. No absolute contraindications (cellulitis overlying joint, infection, lack of informed consent, allergy to injection mediation, rica protein or egg allergy for sodium hyaluronate, or history of steroid flare) or relative contraindications (brittle or out of control DM HgA1c > 10, coagulopathy INR > 3.5, previous joint replacement, or history of AVN).  Staff:?Dr. Jem Zamarripa DO  Description: Time out performed. The patient was prepped using Chlorhexidine scrub after the appropriate identification of anatomic landmarks. Sterile needle used 25-gauge, 1.5 inch?to inject lidocaine 1% for?skin?anaesthesia?then?(size #?18?gauge, length?1.5?inch) used to?drain the effusion and inject?steroid?+ lidocaine:? 5 mL of 1% lidocaine plain with 40 mg of Kenalog. Effusion fluid drained: 43cc straw colored fluid  Complications: None?  EBL: None  Post Procedure: Patient reports improvement in pain and ROM. Patient alert, moving all extremities. Good peripheral pulses, no signs of vascular compromise, range of motion intact. Patient tolerated procedure well. Aftercare instructions were given to patient at time of discharge.  Relative rest for 3 days - avoiding excessive activity. Place ice on area for 15 minutes every 4 to 6 hours. Tylenol 1000mg twice a day or ibuprofen 600 mg three times per day for next 3-4 days if not on medication already. Protect the area for the next 1-8 hours if anesthetic was used. Avoid excessive activity for next 3 to 4 weeks. ER precautions for fever, severe joint pain, or allergic reaction or other new symptoms related to joint injection.  ?  ?  Assessment/Plan  Knee effusion, left?M25.462  ?  Knee osteoarthritis?M17.10  Ordered:  ?  Knee pain, left?M25.562  ?  Tobacco use?Z72.0  ?  Vitamin D deficiency?E55.9  ?  Orders:   Procedure note reviewed. ?Immediately available in the clinic. ?Patient tolerated the procedure well and I was present in the room for the injection at bedside..   Addition to description: 2 cc of lidocaine 1% was used for skin anesthesia.

## 2022-05-04 NOTE — HISTORICAL [facility] CERNER
This is a historical note converted from Lala. Formatting and pictures may have been removed.  Please reference Lala for original formatting and attached multimedia. Chief Complaint  bilateral shoulder pain / right eye tearing  History of Present Illness  61 yo AAM here for f/u. PMHx includes?prostate cancer (dx 8/2019), Followed by Dr. 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 Drinks?12 pack/beer every few days.???Pt has had 1st COVID vaccine.  ?  Prostate Cancer Screening?-?07/06/2020 PSA <0.1Follow up?annually.  Colon Cancer Screening -?11/25/2019 FIT Negative, 03/25/21 FIT Ordered  Osteoporosis Screening?-?05/28/2020 Vit D Lvl 27.2  ?  Todays Visit:  Pt reports today with c/o bilateral knee pain and bilateral shoulder pain. Both have been occurring for years, reports that he was supposed to be referred to the [facility] but has never recieved a call. Referral management does not show any referral started. Pt is agreeable to x-rays today and then a referral will be sent. Pt denies any acute issues today.  Denies chest pain, shortness of breath,?cough, fever, headache,?dizziness, weakness, abdominal pain, nausea,?vomiting, diarrhea, constipation, black/bloody stools, unplanned weight loss, night sweats, changes in urinary patterns, burning/odor with urination, depression, anxiety, and SI/HI.  Review of Systems  Constitutional:?no fever, fatigue, weakness  Eye:?no vision loss, eye redness, drainage, or pain  ENMT:?no sore throat, ear pain, sinus pain/congestion, nasal congestion/drainage  Respiratory:?no cough, no wheezing, no shortness of breath  Cardiovascular:?no chest pain, no palpitations, no edema  Gastrointestinal:?no nausea, vomiting, or diarrhea. No abdominal pain  Genitourinary:?no dysuria, no  urinary frequency or urgency, no hematuria  Hema/Lymph:?no abnormal bruising or bleeding  Endocrine:?no heat or cold intolerance, no excessive thirst or excessive urination  Musculoskeletal:?no muscle or joint pain, no joint swelling  Integumentary:?no skin rash or abnormal lesion  Neurologic: no headache, no dizziness, no weakness or numbness  Physical Exam  Vitals & Measurements  T:?36.8? ?C (Oral)? HR:?79(Peripheral)? RR:?18? BP:?129/85?  HT:?176.00?cm? WT:?82.400?kg? BMI:?26.6?  General:?well-developed well-nourished in no acute distress  Eye: PERRLA, EOMI, clear conjunctiva, eyelids normal  HENT:?TMs/ear canals clear, oropharynx without erythema/exudate, oropharynx and nasal mucosal surfaces moist, no maxillary/frontal sinus tenderness to palpation  Neck: full range of motion, no thyromegaly or lymphadenopathy  Respiratory:?clear to auscultation bilaterally  Cardiovascular:?regular rate and rhythm without murmurs, gallops or rubs  Gastrointestinal:?soft, non-tender, non-distended with normal bowel sounds, without masses to palpation  Genitourinary: no CVA tenderness to palpation  Musculoskeletal:?full range of motion of all extremities/spine without limitation or discomfort  Integumentary: no rashes or skin lesions present  Neurologic: cranial nerves intact, no signs of peripheral neurological deficit, motor/sensory function intact  Assessment/Plan  1.?HTN - Hypertension?I10  BP Today 129/85 At Goal  Low Sodium Diet (Dash Diet - less than 2 grams of sodium per day).  Monitor Blood Pressure daily and log. Report any consistent numbers greater than 140/90.  Maintain healthy weight with goal BMI <30. Exercise 30 minutes per day 5 days per week.  Ordered:  1160F- Medication reconciliation completed during visit, HTN - Hypertension  GERD without esophagitis  COPD exacerbation  Vitamin D deficiency  Tobacco use  Colon cancer screening  Bilateral knee pain  Bilateral shoulder pain  BMI 26.0-26.9,adult, [facility] [place] [facility]  Med C, 03/25/21 12:57:00 CDT  CBC w/ Auto Diff, Routine collect, *Est. 09/25/21 3:00:00 CDT, Blood, Order for future visit, *Est. Stop date 09/25/21 3:00:00 CDT, Lab Collect, HTN - Hypertension, 03/25/21 12:38:00 CDT  Clinic Follow up, *Est. 09/25/21 3:00:00 CDT, Order for future visit, HTN - Hypertension  GERD without esophagitis  COPD exacerbation  Vitamin D deficiency  Tobacco use  Colon cancer screening  Bilateral knee pain  Bilateral shoulder pain, [facility]  Comprehensive Metabolic Panel, Routine collect, *Est. 09/25/21 3:00:00 CDT, Blood, Order for future visit, *Est. Stop date 09/25/21 3:00:00 CDT, Lab Collect, HTN - Hypertension, 03/25/21 12:37:00 CDT  Free T4, Routine collect, *Est. 09/25/21 3:00:00 CDT, Blood, Order for future visit, *Est. Stop date 09/25/21 3:00:00 CDT, Lab Collect, HTN - Hypertension, 03/25/21 12:38:00 CDT  Hemoglobin A1C [facility], Routine collect, *Est. 09/25/21 3:00:00 CDT, Blood, Order for future visit, *Est. Stop date 09/25/21 3:00:00 CDT, Lab Collect, HTN - Hypertension, 03/25/21 12:38:00 CDT  Lipid Panel, Routine collect, *Est. 09/25/21 3:00:00 CDT, Blood, Order for future visit, *Est. Stop date 09/25/21 3:00:00 CDT, Lab Collect, HTN - Hypertension, 03/25/21 12:37:00 CDT  Office/Outpatient Visit Level 4 Established [id number] PC, HTN - Hypertension  GERD without esophagitis  COPD exacerbation  Vitamin D deficiency  Tobacco use  Colon cancer screening  Bilateral knee pain  Bilateral shoulder pain  BMI 26.0-26.9,adult, [facility] Int Med C, 03/25/21 12:57:00 CDT  Thyroid Stimulating Hormone, Routine collect, *Est. 09/25/21 3:00:00 CDT, Blood, Order for future visit, *Est. Stop date 09/25/21 3:00:00 CDT, Lab Collect, HTN - Hypertension, 03/25/21 12:37:00 CDT  Urinalysis with Microscopic if Indicated, Routine collect, Urine, Order for future visit, *Est. 09/25/21 3:00:00 CDT, *Est. Stop date 09/25/21 3:00:00 CDT, Nurse collect, HTN - Hypertension  XR Shoulder Right Minimum 2 Views,  Routine, *Est. 03/25/21 3:00:00 CDT, None, Ambulatory, Rad Type, Order for future visit, HTN - Hypertension  Bilateral shoulder pain, Not Scheduled, *Est. 03/25/21 3:00:00 CDT  ?  2.?GERD without esophagitis?K21.9  Avoid spicy, acidic, fried foods and alcohol.  Eat 2-3 hours before going to bed.  Avoid tight clothing, chew food thoroughly.  Reduce caffeine intake, avoid soda.  Ordered:  1160F- Medication reconciliation completed during visit, HTN - Hypertension  GERD without esophagitis  COPD exacerbation  Vitamin D deficiency  Tobacco use  Colon cancer screening  Bilateral knee pain  Bilateral shoulder pain  BMI 26.0-26.9,adult, [facility] Int Med C, 03/25/21 12:57:00 CDT  Clinic Follow up, *Est. 09/25/21 3:00:00 CDT, Order for future visit, HTN - Hypertension  GERD without esophagitis  COPD exacerbation  Vitamin D deficiency  Tobacco use  Colon cancer screening  Bilateral knee pain  Bilateral shoulder pain, [facility]  Office/Outpatient Visit Level 4 Established [id number] , HTN - Hypertension  GERD without esophagitis  COPD exacerbation  Vitamin D deficiency  Tobacco use  Colon cancer screening  Bilateral knee pain  Bilateral shoulder pain  BMI 26.0-26.9,adult, [facility] Int Med C, 03/25/21 12:57:00 CDT  ?  3.?COPD exacerbation?J44.1  Use inhalers as prescribed (rinse mouth after use of steroid inhalers). Use long term inhalers daily and rescue inhaler as needed.  Avoid triggers (high humidity, strong odors, chemical fumes).  Report signs of upper respiratory infection immediately for early treatment.  Flu shot recommended yearly.  Practice abdominal breathing. Eat smaller, more frequent meals.  Smoking Cessation encouraged.  Ordered:  1160F- Medication reconciliation completed during visit, HTN - Hypertension  GERD without esophagitis  COPD exacerbation  Vitamin D deficiency  Tobacco use  Colon cancer screening  Bilateral knee pain  Bilateral shoulder pain  BMI 26.0-26.9,adult, [facility] Int Med C,  03/25/21 12:57:00 CDT  Clinic Follow up, *Est. 09/25/21 3:00:00 CDT, Order for future visit, HTN - Hypertension  GERD without esophagitis  COPD exacerbation  Vitamin D deficiency  Tobacco use  Colon cancer screening  Bilateral knee pain  Bilateral shoulder pain, [facility]  Office/Outpatient Visit Level 4 Established [id number] PC, HTN - Hypertension  GERD without esophagitis  COPD exacerbation  Vitamin D deficiency  Tobacco use  Colon cancer screening  Bilateral knee pain  Bilateral shoulder pain  BMI 26.0-26.9,adult, [facility] Int Med C, 03/25/21 12:57:00 CDT  ?  4.?Vitamin D deficiency?E55.9  Educated on increasing foods high in Vitamin D such as fish oil, cod liver oil, salmon, milk fortified with vitamin D.  Repeat Vitamin D level as ordered.  Ordered:  1160F- Medication reconciliation completed during visit, HTN - Hypertension  GERD without esophagitis  COPD exacerbation  Vitamin D deficiency  Tobacco use  Colon cancer screening  Bilateral knee pain  Bilateral shoulder pain  BMI 26.0-26.9,adult, [facility] Int Med C, 03/25/21 12:57:00 CDT  Clinic Follow up, *Est. 09/25/21 3:00:00 CDT, Order for future visit, HTN - Hypertension  GERD without esophagitis  COPD exacerbation  Vitamin D deficiency  Tobacco use  Colon cancer screening  Bilateral knee pain  Bilateral shoulder pain, [facility]  Office/Outpatient Visit Level 4 Established [id number] PC, HTN - Hypertension  GERD without esophagitis  COPD exacerbation  Vitamin D deficiency  Tobacco use  Colon cancer screening  Bilateral knee pain  Bilateral shoulder pain  BMI 26.0-26.9,adult, [facility] Int Med C, 03/25/21 12:57:00 CDT  ?  5.?Tobacco use?Z72.0  Smoking cessation discussed.?  Pt not ready to quit.  Discussed benefits of quitting including improved health, decreased cardiac/vascular/pulmonary/stroke risks as well as saving money.  Ordered:  1160F- Medication reconciliation completed during visit, HTN - Hypertension  GERD without esophagitis   COPD exacerbation  Vitamin D deficiency  Tobacco use  Colon cancer screening  Bilateral knee pain  Bilateral shoulder pain  BMI 26.0-26.9,adult, [facility] Int Med C, 03/25/21 12:57:00 CDT  Clinic Follow up, *Est. 09/25/21 3:00:00 CDT, Order for future visit, HTN - Hypertension  GERD without esophagitis  COPD exacerbation  Vitamin D deficiency  Tobacco use  Colon cancer screening  Bilateral knee pain  Bilateral shoulder pain, [facility]  Office/Outpatient Visit Level 4 Established [id number] PC, HTN - Hypertension  GERD without esophagitis  COPD exacerbation  Vitamin D deficiency  Tobacco use  Colon cancer screening  Bilateral knee pain  Bilateral shoulder pain  BMI 26.0-26.9,adult, [facility] Int Med C, 03/25/21 12:57:00 CDT  Vitamin D, 25-Hydroxy Level, Routine collect, *Est. 09/25/21 3:00:00 CDT, Blood, Order for future visit, *Est. Stop date 09/25/21 3:00:00 CDT, Lab Collect, Tobacco use, 03/25/21 12:38:00 CDT  ?  6.?Colon cancer screening?Z12.11  FIT Test Ordered  Ordered:  1160F- Medication reconciliation completed during visit, HTN - Hypertension  GERD without esophagitis  COPD exacerbation  Vitamin D deficiency  Tobacco use  Colon cancer screening  Bilateral knee pain  Bilateral shoulder pain  BMI 26.0-26.9,adult, [facility] Int Med C, 03/25/21 12:57:00 CDT  Clinic Follow up, *Est. 09/25/21 3:00:00 CDT, Order for future visit, HTN - Hypertension  GERD without esophagitis  COPD exacerbation  Vitamin D deficiency  Tobacco use  Colon cancer screening  Bilateral knee pain  Bilateral shoulder pain, [facility]  Occult Blood Fecal Immunoassay, Routine collect, Stool, Order for future visit, *Est. 04/25/21 3:00:00 CDT, *Est. Stop date 04/25/21 3:00:00 CDT, Nurse collect, Colon cancer screening  Office/Outpatient Visit Level 4 Established [id number] PC, HTN - Hypertension  GERD without esophagitis  COPD exacerbation  Vitamin D deficiency  Tobacco use  Colon cancer screening  Bilateral knee pain   Bilateral shoulder pain  BMI 26.0-26.9,adult, [facility] Int Med C, 03/25/21 12:57:00 CDT  ?  7.?Bilateral knee pain?M25.561,?Bilateral knee pain?M25.561  Bilateral Knee X-rays today  Refer to [facility] pending x-ray results  Stop meloxicam  Start Diclofenac sodium 75 mg BID PRN  Diclofenac topical gel prn  Ordered:  diclofenac, 75 mg = 1 tab(s), Oral, BID, 1 tab twice a day as needed for pain, # 60 tab(s), 6 Refill(s), Pharmacy: [id number], 176, cm, Height/Length Dosing, 03/25/21 12:37:00 CDT, 82.4, kg, Weight Dosing, 03/25/21 12:37:00 CDT  diclofenac topical, 2 gm =, TOP, QID, PRN PRN pain, Apply to bilateral knees and bilateral shoulders 4 x daily as needed for pain, # 100 gm, 5 Refill(s), Pharmacy: [id number], 176, cm, Height/Length Dosing, 03/25/21 12:37:00 CDT, 82.4, kg, Weight Dosing, 03/25...  1160F- Medication reconciliation completed during visit, HTN - Hypertension  GERD without esophagitis  COPD exacerbation  Vitamin D deficiency  Tobacco use  Colon cancer screening  Bilateral knee pain  Bilateral shoulder pain  BMI 26.0-26.9,adult, [facility] Int Med C, 03/25/21 12:57:00 CDT  Clinic Follow up, *Est. 09/25/21 3:00:00 CDT, Order for future visit, HTN - Hypertension  GERD without esophagitis  COPD exacerbation  Vitamin D deficiency  Tobacco use  Colon cancer screening  Bilateral knee pain  Bilateral shoulder pain, [facility]  Office/Outpatient Visit Level 4 Established [id number] PC, HTN - Hypertension  GERD without esophagitis  COPD exacerbation  Vitamin D deficiency  Tobacco use  Colon cancer screening  Bilateral knee pain  Bilateral shoulder pain  BMI 26.0-26.9,adult, [facility] Int Med C, 03/25/21 12:57:00 CDT  XR Knee Left 4 or More Views, Routine, *Est. 03/25/21 3:00:00 CDT, None, Ambulatory, Rad Type, Order for future visit, Bilateral knee pain, Not Scheduled, *Est. 03/25/21 3:00:00 CDT  XR Knee Right 4 or More Views, Routine, *Est. 03/25/21 3:00:00 CDT, None, Ambulatory, Rad  Type, Order for future visit, Bilateral knee pain, Not Scheduled, *Est. 03/25/21 3:00:00 CDT  ?  8.?Bilateral shoulder pain?M25.511,?Bilateral shoulder pain?M25.511  Bilateral Shoulder X-rays today  Refer to [facility] pending x-ray results  Stop meloxicam  Start Diclofenac sodium 75 mg BID PRN  Diclofenac topical gel prn  Ordered:  diclofenac, 75 mg = 1 tab(s), Oral, BID, 1 tab twice a day as needed for pain, # 60 tab(s), 6 Refill(s), Pharmacy: [id number], 176, cm, Height/Length Dosing, 03/25/21 12:37:00 CDT, 82.4, kg, Weight Dosing, 03/25/21 12:37:00 CDT  diclofenac topical, 2 gm =, TOP, QID, PRN PRN pain, Apply to bilateral knees and bilateral shoulders 4 x daily as needed for pain, # 100 gm, 5 Refill(s), Pharmacy: [id number], 176, cm, Height/Length Dosing, 03/25/21 12:37:00 CDT, 82.4, kg, Weight Dosing, 03/25...  1160F- Medication reconciliation completed during visit, HTN - Hypertension  GERD without esophagitis  COPD exacerbation  Vitamin D deficiency  Tobacco use  Colon cancer screening  Bilateral knee pain  Bilateral shoulder pain  BMI 26.0-26.9,adult, [facility] Int Med C, 03/25/21 12:57:00 CDT  Clinic Follow up, *Est. 09/25/21 3:00:00 CDT, Order for future visit, HTN - Hypertension  GERD without esophagitis  COPD exacerbation  Vitamin D deficiency  Tobacco use  Colon cancer screening  Bilateral knee pain  Bilateral shoulder pain, [facility]  Office/Outpatient Visit Level 4 Established [id number] PC, HTN - Hypertension  GERD without esophagitis  COPD exacerbation  Vitamin D deficiency  Tobacco use  Colon cancer screening  Bilateral knee pain  Bilateral shoulder pain  BMI 26.0-26.9,adult, [facility] Int Med C, 03/25/21 12:57:00 CDT  XR Shoulder Left Minimum 2 Views, Routine, *Est. 03/25/21 3:00:00 CDT, None, Ambulatory, Rad Type, Order for future visit, Bilateral shoulder pain, Not Scheduled, *Est. 03/25/21 3:00:00 CDT  XR Shoulder Right Minimum 2 Views, Routine, *Est. 03/25/21  3:00:00 CDT, None, Ambulatory, Rad Type, Order for future visit, HTN - Hypertension  Bilateral shoulder pain, Not Scheduled, *Est. 03/25/21 3:00:00 CDT  ?  9.?BMI 26.0-26.9,adult?Z68.26  goal BMI <30.  Exercise 5 times a week for 30 minutes per day.  Avoid soda, simple sugars, excessive rice, potatoes or bread. Limit fast foods and fried foods.  Choose complex carbs in moderation (example: green vegetables, beans, oatmeal). Eat plenty of fresh fruits and vegetables with lean meats daily.  Do not skip meals. Eat a balanced portion size.  Avoid fad diets. Consider permanent healthy life style changes.?  Ordered:  1160F- Medication reconciliation completed during visit, HTN - Hypertension  GERD without esophagitis  COPD exacerbation  Vitamin D deficiency  Tobacco use  Colon cancer screening  Bilateral knee pain  Bilateral shoulder pain  BMI 26.0-26.9,adult, [facility], 03/25/21 12:57:00 CDT  Office/Outpatient Visit Level 4 Established [id number] PC, HTN - Hypertension  GERD without esophagitis  COPD exacerbation  Vitamin D deficiency  Tobacco use  Colon cancer screening  Bilateral knee pain  Bilateral shoulder pain  BMI 26.0-26.9,adult, [facility] Int [facility], 03/25/21 12:57:00 CDT  ?  10.?Seasonal allergies?J30.2,?Seasonal allergies?J30.2  OTC antihistamines as needed?(i.e. Zyrtec, Claritin, Allergra, Benadryl)  Increase PO Fluids  Avoid/limit triggers such as pollen, dust, molds, and pet dander.?  Avoid smoke, strong chemicals, cleaning products, perfumes/colognes.  Ordered:  cetirizine, 10 mg = 1 tab(s), Oral, Daily, Take 1 tablet a day for allergies, # 30 tab(s), 6 Refill(s), Pharmacy: [id number], 176, cm, Height/Length Dosing, 03/25/21 12:37:00 CDT, 82.4, kg, Weight Dosing, 03/25/21 12:37:00 CDT  fluticasone nasal, 1 spray(s), Nasal, BID, in each nostril, # 16 gm, 5 Refill(s), Pharmacy: [id number], 176, cm, Height/Length Dosing, 03/25/21 12:37:00 CDT, 82.4, kg, Weight Dosing, 03/25/21  12:37:00 CDT  ?  Referrals  Clinic Follow up, *Est. 09/25/21 3:00:00 CDT, Order for future visit, HTN - Hypertension  GERD without esophagitis  COPD exacerbation  Vitamin D deficiency  Tobacco use  Colon cancer screening  Bilateral knee pain  Bilateral shoulder pain, [facility]  RTC?prn and?6 months  Medications reviewed & reconciled during visit  Labs reviewed, verbalized understanding  Labs 1 week prior to next appointment  COVID 19 Precautions discussed  ?   Discussed with patient health goals related to [facility]?- Restore, Maintain,?&?Improve Health  Patient Goal this visit:?Improve  ?   ?2 or more stable chronic illnesses / undiagnosed new problem with uncertain prognosis / 1 acute illness with systemic symptoms / 1 acute complicated injury.  I have reviewed prior external notes / reviewed results of each unique test /ordered a unique test /  This assessment required an independent historian.  ?There is moderate risk of morbidity from additional diagnostic testing or treatment including prescription drug managing.  Diagnosis & treatment are significantly limited by social determinants of health.?  I spent a total of? 30 minutes reviewing the patients chart prior to our face to face time, seeing the patient, speaking with nurse, and documenting in the record.  ?  ?   Problem List/Past Medical History  Ongoing  Asthma  BMI 25.0-25.9,adult  Chronic back pain  Chronic cough  Chronic neck pain  Cocaine use  COPD exacerbation  COPD without exacerbation  DJD (degenerative joint disease) of cervical spine  DJD (degenerative joint disease), lumbar  Elevated PSA  GERD without esophagitis  HTN - Hypertension  Hx of carcinoma in situ of prostate  Knee effusion, left  Knee osteoarthritis  Knee pain, left  Leukocytosis  Peripheral neuropathy  Tobacco use  Vitamin D deficiency  Wellness examination  Historical  Prostate cancer  Procedure/Surgical History  Biopsy, prostate; incisional, any approach  (08/20/2019)  Excision of Prostate, Open Approach, Diagnostic (08/20/2019)  Ultrasonography of Prostate and Seminal Vesicles (08/20/2019)  muscle tissue removed from forehead (08/31/2018)  PROSTATE SURG   Medications  Advair Diskus 250 mcg-50 mcg inhalation powder, 1 inh, INH, BID, 11 refills  albuterol CFC free 90 mcg/inh inhalation aerosol with adapter, 2 puff(s), INH, QID, PRN, 12 refills  amitriptyline 10 mg oral tablet, 10 mg= 1 tab(s), Oral, Once a day (at bedtime), 2 refills  amlodipine 5 mg oral tablet, 5 mg= 1 tab(s), Oral, Daily, 3 refills  calcium carbonate 600 mg oral tablet, 1200 mg= 2 tab(s), Oral, Daily  diclofenac 1% topical gel, 2 gm, TOP, QID, PRN, 5 refills  diclofenac sodium 75 mg oral delayed release tablet, 75 mg= 1 tab(s), Oral, BID, 6 refills  Flonase 50 mcg/inh nasal spray, 1 spray(s), Nasal, BID, 5 refills  Hinged Brace for Left Knee, See Instructions  hydrochlorothiazide-lisinopril 25 mg-20 mg oral tablet, 1 tab(s), Oral, Daily, 3 refills  Prilosec 40 mg oral DR capsule, 40 mg= 1 cap(s), Oral, Daily, 3 refills  Spiriva HandiHaler 18 mcg inhalation capsule, 18 mcg= 1 cap(s), INH, Daily, 11 refills  tamsulosin 0.4 mg oral capsule, 0.4 mg= 1 cap(s), Oral, qPM, 5 refills  Vitamin D2 2000 intl units oral capsule, 2000 IntUnit= 1 cap(s), Oral, Daily  Zyrtec 10 mg oral tablet, 10 mg= 1 tab(s), Oral, Daily, 6 refills  Allergies  minocycline?(Rash)  Social History  Abuse/Neglect  No, No, Yes, 03/25/2021  Alcohol  Current, Beer, 1-2 times per month, 03/25/2021  Employment/School  Unemployed, 11/27/2019  Exercise  Exercise type: Walking., 01/23/2019  Home/Environment  Lives with Significant other., 09/19/2018  Nutrition/Health  Regular, 01/23/2019  Sexual  Sexual orientation: Straight or heterosexual. Gender Identity Identifies as male., 01/23/2019  Spiritual/Cultural  [place], 09/30/2019  Substance Use  Never, 07/06/2017  Tobacco  4 or less cigarettes(less than 1/4 pack)/day in last 30 days, No,  03/25/2021  Family History  Cancer: Mother and Sister.  Tobacco user: Brother.  Immunizations  Vaccine Date Status   COVID-19 MRNA, LNP-S, PF- Pfizer 03/15/2021 Given   influenza virus vaccine, inactivated 12/20/2018 Given   tetanus/diphtheria/pertussis, acel(Tdap) 07/11/2018 Given   Health Maintenance  Health Maintenance  ???Pending?(in the next year)  ??? ??OverDue  ??? ? ? ?Influenza Vaccine due??10/01/20??and every 1??day(s)  ??? ? ? ?Colorectal Screening due??11/24/20??and every 1??year(s)  ??? ??Due?  ??? ? ? ?Asthma Management-[place] Flow due??03/25/21??Variable frequency  ??? ? ? ?Asthma Management-Written Action Plan due??03/25/21??and every 6??month(s)  ??? ? ? ?COPD Maintenance-Pulmonary Rehab Education due??03/25/21??and every 1??year(s)  ??? ? ? ?Zoster Vaccine due??03/25/21??Unknown Frequency  ??? ??Due In Future?  ??? ? ? ?Asthma Management-Spirometry not due until??07/01/21??and every 1??year(s)  ??? ? ? ?COPD Management-Oxygen Assessment not due until??07/06/21??and every 1??year(s)  ??? ? ? ?Blood Pressure Screening not due until??07/17/21??and every 1??year(s)  ??? ? ? ?Hypertension Management-Blood Pressure not due until??07/17/21??and every 1??year(s)  ??? ? ? ?COPD Management-COPD Medications Prescribed not due until??07/17/21??and every 1??year(s)  ??? ? ? ?Asthma Management-Asthma Education not due until??09/25/21??and every 6??month(s)  ??? ? ? ?Obesity Screening not due until??01/01/22??and every 1??year(s)  ??? ? ? ?Alcohol Misuse Screening not due until??01/02/22??and every 1??year(s)  ??? ? ? ?Diabetes Screening not due until??03/24/22??and every 1??year(s)  ??? ? ? ?Hypertension Management-BMP not due until??03/24/22??and every 1??year(s)  ???Satisfied?(in the past 1 year)  ??? ??Satisfied?  ??? ? ? ?ADL Screening on??03/25/21.??Satisfied by Sarah Ackerman LPN  ??? ? ? ?Alcohol Misuse Screening on??03/25/21.??Satisfied by Ama Crabtree  ??? ? ? ?Aspirin Therapy for CVD  Prevention on??03/25/21.??Satisfied by James STANFORD-Ama SMITH  ??? ? ? ?Asthma Management-Asthma Education on??03/25/21.??Satisfied by James STANFORD-CAma  ??? ? ? ?Asthma Management-Spirometry on??07/01/20.??Satisfied by Radha Mayer  ??? ? ? ?Asthma Management-Asthma Medication Prescribed on??06/01/20.??Satisfied by Unique Lopez  ??? ? ? ?Blood Pressure Screening on??03/25/21.??Satisfied by Sarah Ackerman LPN  ??? ? ? ?Body Mass Index Check on??03/25/21.??Satisfied by Sarah Ackerman LPN  ??? ? ? ?COPD Maintenance-Spirometry on??07/01/20.??Satisfied by Radha Mayer  ??? ? ? ?COPD Management-COPD Medications Prescribed on??07/17/20.??Satisfied by Unique Lopez  ??? ? ? ?COPD Management-Oxygen Assessment on??07/06/20.??Satisfied by Kinjal Muniz LPN  ??? ? ? ?Depression Screening on??03/25/21.??Satisfied by Sarah Ackerman LPN  ??? ? ? ?Diabetes Screening on??03/24/21.??Satisfied by Augustine Vásquez Jr.  ??? ? ? ?Hypertension Management-Education on??03/25/21.??Satisfied by James STANFORD-Ama SMITH  ??? ? ? ?Hypertension Management-Blood Pressure on??03/25/21.??Satisfied by Sarah Akcerman LPN  ??? ? ? ?Hypertension Management-BMP on??03/24/21.??Satisfied by Augustine Vásquez Jr.  ??? ? ? ?Influenza Vaccine on??03/25/21.??Satisfied by Sarah Ackerman LPN  ??? ? ? ?Lipid Screening on??03/24/21.??Satisfied by Augustine Vásquez Jr.  ??? ? ? ?Obesity Screening on??03/25/21.??Satisfied by Sarah Ackerman LPN  ?

## 2022-05-04 NOTE — HISTORICAL [facility] CERNER
This is a historical note converted from [employer]. Formatting and pictures may have been removed.  Please reference [employer] for original formatting and attached multimedia. Chief Complaint  establish PCP- rx refills , nausea and vomiting last episode 2 days ago , allergies , tingling in hands at times for years and left knee pain  History of Present Illness  57 year AAM here today for establish PCP. Hx HTN mild elevation today. Pt was started on meds 2 weeks ago with improvement from previous numbers. Pt reports upper abd pain juan after eating BBQ with associated nausea. Pty has left knee pain with swelling to left outer knee with fluid. 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 he also has numbness to bilateral hands that is worse with constant activity. Pt also has soft tissue mass to forehead that is soft and palpable. he reports he was seen in [facility] and was suppose to have it removed and [place] hit and hospital was shut down. He also has a hard mass that started about 2 inches from the larger soft mass that is non painful. Tobacco use pt desires to quit.  Review of Systems  All Systems Negative Except: See HPI  Physical Exam  Vitals & Measurements  T:?36.8? ?C ?(Oral)? HR:?75?(Peripheral)? RR:?18? BP:?136/90? HT:?175?cm? HT:?175?cm? HT:?175?cm? WT:?78.9?kg? WT:?78.9?kg? WT:?78.9?kg? BMI:?25.76?  General:? Alert and oriented, No acute distress.?  Eye:? Pupils are equal, round and reactive to light, Normal conjunctiva.?  HENT:? Normocephalic, Normal hearing, Oral mucosa is moist, No pharyngeal erythema.?  Neck:? Supple, Non-tender, No carotid bruit, No jugular venous distention, No lymphadenopathy, No thyromegaly.?  Respiratory:? Lungs are clear to auscultation, Respirations are non-labored, Breath sounds are equal, Symmetrical chest wall expansion.?  Cardiovascular:? Normal rate, Regular rhythm, No murmur, Good pulses equal in all extremities, Normal peripheral perfusion, No  edema.?  Gastrointestinal:? Soft, Non-tender, Non-distended, Normal bowel sounds, No organomegaly.?  Musculoskeletal: Normal range of motion, Normal strength, No tenderness, No deformity, Normal gait.?  Integumentary:? Warm, Dry.?Soft tissue mass head  Neurologic:? Alert, Oriented.?  Psychiatric:? Cooperative, Appropriate mood & affect.?  ?  ?  Assessment/Plan  Hand numbness  ?X ray neck today if negative consider carpal tunnel workup  Ordered:  Clinic Follow up, 05/16/17 11:17:00 CDT, 6 month F/U, HTN (hypertension)  Left knee pain  Mass of head  Hand numbness, [facility]  XR Spine Cervical 2 or 3 Views, Routine, *Est. 05/16/17 3:00:00 CDT, Numbness, numbness to hands, None, Ambulatory, Rad Type, Order for future visit, Hand numbness, On Exactly, *Est. 05/16/17 3:00:00 CDT  ?  HTN (hypertension)  ?At goal  Continue med  Low sodium diet  Ordered:  hydroCHLOROthiazide-lisinopril, 1 tab(s), Oral, Daily, # 30 tab(s), 11 Refill(s), Pharmacy: [id number]  Clinic Follow up, 05/16/17 11:17:00 CDT, 6 month F/U, HTN (hypertension)  Left knee pain  Mass of head  Hand numbness, [facility]  ?  Left knee pain  ?X ray today consider ortho referral  Ordered:  Clinic Follow up, 05/16/17 11:17:00 CDT, 6 month F/U, HTN (hypertension)  Left knee pain  Mass of head  Hand numbness, [facility]  XR Knee Left 3 Views, Routine, *Est. 05/16/17 3:00:00 CDT, Pain, pain, None, Ambulatory, Rad Type, Order for future visit, Left knee pain, On Exactly, *Est. 05/16/17 3:00:00 CDT  ?  Mass of head  ?refer to ENT for removal  ?X ray head today  Ordered:  Clinic Follow up, 05/16/17 11:17:00 CDT, 6 month F/U, HTN (hypertension)  Left knee pain  Mass of head  Hand numbness, [facility]  Internal Referral to ENT, Mass on forehead, *Est. 06/15/17 3:00:00 CDT, Future Visit?, Mass of head  XR Skull up to 3 Views, Routine, *Est. 05/16/17 3:00:00 CDT, Swelling, mass of head, None, Ambulatory, Rad Type, Order for future visit,  Mass of head, On Exactly, *Est. 05/16/17 3:00:00 CDT  ?  Tobacco use  ?Refer to cessation  Ordered:  Internal Referral to Smoking Cessation, ASKED and ADVISED to quit. Ready to Quit, Telephone Counseling, 05/16/17 11:14:00 CDT, Tobacco use  ?  Upper abdominal pain  Start PPI  Avoid spicy and acidic foods  H pylori  Ordered:  omeprazole, 40 mg = 1 cap(s), Oral, Daily, # 30 cap(s), 5 Refill(s), Pharmacy: [id number]  Helicobacter Pylori IgG Quant-[facility] [id number], Routine collect, *Est. 05/16/17 3:00:00 CDT, Blood, Order for future visit, *Est. Stop date 05/16/17 3:00:00 CDT, Lab Collect, Upper abdominal pain, On Exactly, 05/16/17 11:20:00 CDT  ?  Wellness examination  Ordered:  [place] [facility] [place], *Est. 05/16/17 3:00:00 CDT, Blood, Order for future visit, *Est. Stop date 05/16/17 3:00:00 CDT, Lab Collect, Wellness examination, On Exactly, 05/16/17 11:08:00 CDT  HIV 1 and 2, Routine collect, *Est. 05/16/17 3:00:00 CDT, Blood, Order for future visit, *Est. Stop date 05/16/17 3:00:00 CDT, Lab Collect, Wellness examination, On Exactly, 05/16/17 11:08:00 CDT  Lipid Panel, Routine collect, *Est. 05/16/17 3:00:00 CDT, Blood, Order for future visit, *Est. Stop date 05/16/17 3:00:00 CDT, Lab Collect, Wellness examination, On Exactly, 05/16/17 11:08:00 CDT  Prostate Specific Antigen, Routine collect, *Est. 05/16/17 3:00:00 CDT, Blood, Order for future visit, *Est. Stop date 05/16/17 3:00:00 CDT, Lab Collect, Wellness examination, On Exactly, 05/16/17 11:08:00 CDT  RPR, Routine collect, *Est. 05/16/17 3:00:00 CDT, Blood, Order for future visit, *Est. Stop date 05/16/17 3:00:00 CDT, Lab Collect, Wellness examination, On Exactly, 05/16/17 11:08:00 CDT  Thyroid Stimulating Hormone, Routine collect, *Est. 05/16/17 3:00:00 CDT, Blood, Order for future visit, *Est. Stop date 05/16/17 3:00:00 CDT, Lab Collect, Wellness examination, On Exactly, 05/16/17 11:08:00 CDT  Urinalysis with Microscopic if  Indicated, Routine collect, Urine, Order for future visit, *Est. 05/16/17 3:00:00 CDT, *Est. Stop date 05/16/17 3:00:00 CDT, Nurse collect, Wellness examination, On Exactly, 05/16/17 11:08:00 CDT  ?   Problem List/Past Medical History  HTN - Hypertension  Tobacco use  Historical  No historical problems  Procedure/Surgical History  none.  Medications  albuterol CFC free 90 mcg/inh inhalation aerosol with adapter, 2 puff(s), INH, QID, PRN  hydroCHLOROthiazide-lisinopril 12.5 mg-10 mg oral tablet, 1 tab(s), Oral, Daily  Allergies  No Known Allergies  Social History  Alcohol  Current, Beer, 1-2 times per week, 3 drinks/episode average.  Tobacco  Current, Cigarettes, 10 per day.

## 2022-05-04 NOTE — HISTORICAL [facility] CERNER
This is a historical note converted from Lala. Formatting and pictures may have been removed.  Please reference Lala for original formatting and attached multimedia. Chief Complaint  follow up - cold symptoms - coughing up yellow mucus / chest congestion  History of Present Illness  58 yo Black male being seen in clinic for follow-up, medication reconciliation, and lab review (8/31/2019).? Hx includes?recently diagnosed prostate cancer (8/2019). ?He is being followed by Dr. Rudolph at [facility] urology clinic.??Scheduled for in [place] on 12/12/2019.? ?CT of abdomen was negative. 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 Stress test done 1/17/2019 did not show any inducible ischemia.? X-ray of the cervical spine done 6/20/2019 showed diffuse degenerative changes and mild alignment.? X-ray of the bilateral knees on 10/14/2019 showed minimal degenerative changes.? Referral to Ortho clinic was denied.? 30 pack/year smoker.? Drinks?12 pack/beer every few days. ?Denies illicit drug use.? 20 pack/year?hx smoking.? Has been cutting back over past couple of months.? Smoking about 3 cigarettes a day.? Does not want medications or referral to [facility] smoking cessation program.? Has not worked for about 6 months now due to worsening chronic neck, back, and bilateral knee pain.? Worked as  for past 20 years.? From [place] originally.? Lives with female friend.? Lives in [place].? Has 7 children.?  ?  Today 11/15/2019:  Aspirin, FIT, and flu.? Flu not given today due to respiratory infection.? Needs disability forms completed.  Has had a cough and chest congestion for about one month now.? No fevers or chills.? Appetite is so-so.? Having light beige mucous with coughing, with occasional green shit.? No antibiotics since cough started.? Has not been  taking anything for cold.? Sleeping poorly.? Having shortness of breath, which is something new for him, along with sneezing and headache.? Nose is stopped up, as well.???  ?   Primary complaint today is chronic and persistent?neck,?lower back, and bilateral knee pain. ?He is not been able to work for about 6 months,?with worsening chronic generalized pain. ?He does report numbness and tingling?in his extremities, however,?he has not taken Gabapentin in some time, and says he does not believe that medication helped with numbness, tingling, and pain. ?He is having difficulty?picking objects up?and carrying them with his arms, stating that?his extremities are extremely weak. ?He has never done outpatient physical therapy. ?He is willing to try outpatient PT; however, he is concerned about transportation to get to clinic for sessions.? Denies chest pain, dizziness, weakness, abdominal pain, nausea,?vomiting, diarrhea, constipation, dysuria, depression, anxiety, SI/HI.? Says he does have passing depression because he has not been working.?Plans to move to [place] following upcoming prostate surgery, if possible.  ?  Review of Systems  Except as stated in HPI, all other 10 body systems normal  ?  Physical Exam  Vitals & Measurements  T:?36.6? ?C (Oral)? HR:?81(Peripheral)? RR:?18? BP:?122/80?  HT:?175?cm? WT:?78.6?kg? BMI:?25.67?  General:??VSS; afebrile.??Ill-appearing, somewhat disheveled; in no acute distress  Eyes/Ears/Nose: EOMI, clear conjunctiva, eyelids normal,? TMs gray with good light reflex; no erythema or drainage.? No sinus tenderness with palpation.? Mild erythema over turinates.?  Mouth/pharynx:??tongue midline, pink, and moist; no lesions or erythema in oral cavity; mild erythema of pharynx; no exudates  Neck: limited range of motion in neck; he is only able to turn head 50% full range; point tenderness along cervical spine, with radiating tenderness along bilaterally upper shoulders.? No thyromegaly  or lymphadenopathy  Respiratory:?clear to auscultation anteriorly and posteriorly but diminished throughout all lung fields; occasional non-productive cough.?  Cardiovascular:?regular rate and rhythm without murmurs, gallops or rubs.??No peripheral edema.??No hemosiderin staining or varicosities.?  Gastrointestinal:?obese abdomen, protruberant, soft, non-tender, with normal bowel sounds, without masses to palpation  Genitourinary: deferred  Musculoskeletal: he was unable to perform left straight leg raise due to knee pain.? He had a positive left leg straight raise at approximately 30 degrees.? Ambulates with left-sided limp.? Gait is very slow.? He had to push himself up out of chair in examination room by pushing on furniture with both hands.?  Integumentary: no rashes or skin lesions present  Neurologic: A&O X 3; cranial nerves intact, no signs of peripheral neurological deficit, motor/sensory function intact  Psychiatric:?Calm, quiet, and cooperative.??Mood blunted and withdrawn;??affect flat.???Responses appropriate  ?  Assessment/Plan  1.?COPD exacerbation?J44.1  CXR ordered today  RX:? Augmentin 875-125 mg X 10 days  RX:? Prednisone 20 mg daily X 5 days  RX:? Tessalon Perles for cough.  Instructed him to go to [facility] if his signs and symptoms?persist, or if they worsen.  Ordered:  amoxicillin-clavulanate, = 1 tab(s), Oral, q12hr, For sinus infection/lung congestion, X 10 day(s), # 20 tab(s), 0 Refill(s), [id number]  benzonatate, 100 mg = 1 cap(s), Oral, q8hr, PRN PRN as needed for cough, For cough, X 10 day(s), # 30 cap(s), 0 Refill(s), [id number]  predniSONE, 20 mg = 1 tab(s), Oral, Daily, For cough/cold, X 5 day(s), # 5 tab(s), 0 Refill(s), Pharmacy: [id number]  1160F- Medication reconciliation completed during visit, HTN - Hypertension  GERD without esophagitis  DJD (degenerative joint disease) of cervical spine  Vitamin D deficiency  Peripheral  neuropathy  Tobacco use  COPD exacerbation  Colon cancer screening, [facility], 11/15/19 10:37:00 CST  Clinic Follow up, *Est. 02/18/20 8:00:00 CST, Order for future visit, HTN - Hypertension, [facility]  Office/Outpatient Visit Level 4 Established 86477 PC, HTN - Hypertension  GERD without esophagitis  DJD (degenerative joint disease) of cervical spine  Vitamin D deficiency  Peripheral neuropathy  Tobacco use  COPD exacerbation  Colon cancer screening, [facility], 11/15/19 10:37:00 CST  ?  2.?Prostate cancer?C61  [place]gery in [place] 12[place]12[place]2019  Per Dr. Rudolph, [facility] urology clinic  PSA?level 5.0 on 12/20/2018  Prescribed?Flomax 0.4 mg nightly  Handouts given today.  Ordered:  CBC w/ Auto Diff, Routine collect, *Est. 02/15/20 3:00:00 CST, Blood, Order for future visit, *Est. Stop date 02/15/20 3:00:00 CST, Lab Collect, Wellness examination  Prostate cancer, 11/15/19 10:38:00 CST  ?  3.?DJD (degenerative joint disease) of cervical spine?M47.812,?DJD (degenerative joint disease) of cervical spine?M47.812  RX:? Outpatient PT today  Will complete disability paperwork, with todays findings.  Ordered:  1160F- Medication reconciliation completed during visit, HTN - Hypertension  GERD without esophagitis  DJD (degenerative joint disease) of cervical spine  Vitamin D deficiency  Peripheral neuropathy  Tobacco use  COPD exacerbation  Colon cancer screening, [facility], 11/15/19 10:37:00 CST  Clinic Follow up, *Est. 02/18/20 8:00:00 CST, Order for future visit, HTN - Hypertension, [facility]  Office/Outpatient Visit Level 4 Established 27110 PC, HTN - Hypertension  GERD without esophagitis  DJD (degenerative joint disease) of cervical spine  Vitamin D deficiency  Peripheral neuropathy  Tobacco use  COPD exacerbation  Colon cancer screening, [facility], 11/15/19 10:37:00 CST  ?  4.?DJD (degenerative joint disease), lumbar?M47.816  RX:? Outpatient PT today  Will complete disability paperwork,  with todays findings.  Ordered:  [facility] Prescription, Outpatient Physical Therapy, See Instructions, PT to evaluate and treat chronic neck, lower back, and knee pain DX: M47.816, M47.812, M17.10, # 1 EA, 0 Refill(s)  ?  5.?Knee osteoarthritis?M17.10  RX:? Outpatient PT today  Will complete disability paperwork, with todays findings.  Ordered:  [facility] Prescription, Outpatient Physical Therapy, See Instructions, PT to evaluate and treat chronic neck, lower back, and knee pain DX: M47.816, M47.812, M17.10, # 1 EA, 0 Refill(s)  ?  6.?HTN - Hypertension?I10  A goal today; 122/80.? CPM.?  eGFR?105 on 8/31/2019.? Labs in 3 months  Prescribed HCTZ-lisinopril 25 mg - 20 mg daily  Handouts given today.  Ordered:  hydrochlorothiazide-lisinopril, 1 tab(s), Oral, Daily, Blood pressure, # 30 tab(s), 6 Refill(s), Pharmacy: [id number] 5097F- Medication reconciliation completed during visit, HTN - Hypertension  GERD without esophagitis  DJD (degenerative joint disease) of cervical spine  Vitamin D deficiency  Peripheral neuropathy  Tobacco use  COPD exacerbation  Colon cancer screening, [facility], 11/15/19 10:37:00 CST  Clinic Follow up, *Est. 02/18/20 8:00:00 CST, Order for future visit, HTN - Hypertension, [facility]  Comprehensive Metabolic Panel, Routine collect, *Est. 02/15/20 3:00:00 CST, Blood, Order for future visit, *Est. Stop date 02/15/20 3:00:00 CST, Lab Collect, HTN - Hypertension, 11/15/19 10:38:00 CST  Office/Outpatient Visit Level 4 Established 40432 PC, HTN - Hypertension  GERD without esophagitis  DJD (degenerative joint disease) of cervical spine  Vitamin D deficiency  Peripheral neuropathy  Tobacco use  COPD exacerbation  Colon cancer screening, [facility], 11/15/19 10:37:00 CST  ?  7.?GERD without esophagitis?K21.9  Stable; CPM  Prescribed omeprazole 40 mg 1 tablet daily  Handouts given today.  Ordered:  omeprazole, 40 mg = 1 cap(s), Oral, Daily, Heart burn, # 90 cap(s), 3  Refill(s), Pharmacy: [id number]  1160F- Medication reconciliation completed during visit, HTN - Hypertension  GERD without esophagitis  DJD (degenerative joint disease) of cervical spine  Vitamin D deficiency  Peripheral neuropathy  Tobacco use  COPD exacerbation  Colon cancer screening, [facility], 11/15/19 10:37:00 CST  Clinic Follow up, *Est. 02/18/20 8:00:00 CST, Order for future visit, HTN - Hypertension, [facility]  Office/Outpatient Visit Level 4 Established [id number] PC, HTN - Hypertension  GERD without esophagitis  DJD (degenerative joint disease) of cervical spine  Vitamin D deficiency  Peripheral neuropathy  Tobacco use  COPD exacerbation  Colon cancer screening, [facility] , 11/15/19 10:37:00 CST  ?  8.?Vitamin D deficiency?E55.9  Vitamin D level?24.08 on 8/29/2019  Prescribed vitamin D 1000 IU daily; CPM  Handouts given today.  Ordered:  1160F- Medication reconciliation completed during visit, HTN - Hypertension  GERD without esophagitis  DJD (degenerative joint disease) of cervical spine  Vitamin D deficiency  Peripheral neuropathy  Tobacco use  COPD exacerbation  Colon cancer screening, [facility], 11/15/19 10:37:00 CST  Clinic Follow up, *Est. 02/18/20 8:00:00 CST, Order for future visit, HTN - Hypertension, [facility]  Office/Outpatient Visit Level 4 Established 58840 PC, HTN - Hypertension  GERD without esophagitis  DJD (degenerative joint disease) of cervical spine  Vitamin D deficiency  Peripheral neuropathy  Tobacco use  COPD exacerbation  Colon cancer screening, [facility], 11/15/19 10:37:00 CST  Vitamin D, 25-Hydroxy Level, Routine collect, *Est. 02/15/20 3:00:00 CST, Blood, Order for future visit, *Est. Stop date 02/15/20 3:00:00 CST, Lab Collect, Vitamin D deficiency, 11/15/19 10:38:00 CST  ?  9.?Peripheral neuropathy?G62.9  He had been prescribed gabapentin?in the past, however, that is not showing up on his current MAR.? Did not help with  current symptoms; therefore,?not going to re-prescribe?given potential side effects.  Ordered:  1160F- Medication reconciliation completed during visit, HTN - Hypertension  GERD without esophagitis  DJD (degenerative joint disease) of cervical spine  Vitamin D deficiency  Peripheral neuropathy  Tobacco use  COPD exacerbation  Colon cancer screening, [facility], 11/15/19 10:37:00 CST  Clinic Follow up, *Est. 02/18/20 8:00:00 CST, Order for future visit, HTN - Hypertension, [facility]  Office/Outpatient Visit Level 4 Established 06494 PC, HTN - Hypertension  GERD without esophagitis  DJD (degenerative joint disease) of cervical spine  Vitamin D deficiency  Peripheral neuropathy  Tobacco use  COPD exacerbation  Colon cancer screening, [facility], 11/15/19 10:37:00 CST  ?  10.?Tobacco use?Z72.0  Handouts given today.  Smoking cessation counseling provided, with emphasis provided on long-term risks and complications associated with smoking.? Instructed on community resources available to him, smoking cessation program through [facility], and pharmacological interventions to aid in cessation.? Instructed on importance of setting a stop date; and, that it takes, on average 4 to 7 times for complete success in cessation.??  Ordered:  1160F- Medication reconciliation completed during visit, HTN - Hypertension  GERD without esophagitis  DJD (degenerative joint disease) of cervical spine  Vitamin D deficiency  Peripheral neuropathy  Tobacco use  COPD exacerbation  Colon cancer screening, [facility], 11/15/19 10:37:00 CST  Clinic Follow up, *Est. 02/18/20 8:00:00 CST, Order for future visit, HTN - Hypertension, [facility]  Office/Outpatient Visit Level 4 Established 98625 PC, HTN - Hypertension  GERD without esophagitis  DJD (degenerative joint disease) of cervical spine  Vitamin D deficiency  Peripheral neuropathy  Tobacco use  COPD exacerbation  Colon cancer screening, [facility],  11/15/19 10:37:00 CST  ?  11.?BMI 25.0-25.9,adult?Z68.25  Handouts given today.  Reviewed goal BMI with his BMI today.? Teaching provided on long-term complications associated with obesity, weight loss measures, increasing physical activity, improving diet, avoiding sugary foods and liquids, avoiding processed and fast foods, and increasing vegetables and fruits.? Teaching provided on how increased BMI, and adipose tissue, increases systemic inflammation, impairs immune function, and increases risk for DM, HTN, and HLD, which increases risk for MIs, CVAs, renal disease, lower leg amputations, infections, and blindness.??  ?  Teaching provided on health risks associated with high fructose corn syrup, including elevated triglycerides, weight gain, and risks of diabetes, hypertension, and cardiac disease.? Handouts given today.? Instructed on how to read food labels to identify that artificial sweetener; instructed to avoid processed foods containing that chemical.  ?  12.?Wellness examination?Z00.00  CBC on 8/31/2019:?WBC elevated at 12.8,?otherwise unremarkable  UA on 9/30/2019:?0-2 RBCs  Hemoglobin A1c 5.8 on 12/20/2018  Vitamin B12 level 813 on 6/20/2019  Lipid panel on 7/12/2018: Total 142, HDL 38, trig 113, LDL 81  TSH normal on 7/12/2018  ABDOUL negative on 6/20/2019?  RF <10 on 6/20/2019  HIV nonreactive on 5/16/2017?  HEP nonreactive on 12/20/2018  RPR non-reactive 5/16/2017  Ordered:  CBC w/ Auto Diff, Routine collect, *Est. 02/15/20 3:00:00 CST, Blood, Order for future visit, *Est. Stop date 02/15/20 3:00:00 CST, Lab Collect, Wellness examination  Prostate cancer, 11/15/19 10:38:00 CST  Drug Screen Urine [facility], Routine collect, Urine, Order for future visit, *Est. 02/15/20 3:00:00 CST, *Est. Stop date 02/15/20 3:00:00 CST, Nurse collect, ETOH abuse  Wellness examination, 11/15/19 13:34:00 CST  Hemoglobin A1C [facility], Routine collect, *Est. 02/15/20 3:00:00 CST, Blood, Order for future visit, *Est. Stop date  02/15/20 3:00:00 CST, Lab Collect, Wellness examination, 11/15/19 10:38:00 CST  Lipid Panel, Routine collect, *Est. 02/15/20 3:00:00 CST, Blood, Order for future visit, *Est. Stop date 02/15/20 3:00:00 CST, Lab Collect, Wellness examination, 11/15/19 10:38:00 CST  Thyroid Stimulating Hormone, Routine collect, *Est. 02/15/20 3:00:00 CST, Blood, Order for future visit, *Est. Stop date 02/15/20 3:00:00 CST, Lab Collect, Wellness examination, 11/15/19 10:38:00 CST  ?  Orders:  albuterol, 2 puff(s), INH, QID, PRN PRN cough or wheezing, # 1 EA, 12 Refill(s), [facility]: [id number]  indomethacin, 25 mg = 1 cap(s), Oral, BID, PRN PRN for arthritis, Joint pain, # 60 cap(s), 2 Refill(s), [facility]: [id number]  tamsulosin, 0.4 mg = 1 cap(s), Oral, qPM, Prostate, # 30 cap(s), 5 Refill(s), Pharmacy: [id number]  Occult Blood Fecal Immunoassay, Routine collect, Stool, Order for future visit, *Est. 12/15/19 3:00:00 CST, *Est. Stop date 12/15/19 3:00:00 CST, Nurse collect, Colon cancer screening  Referrals  Clinic Follow up, *Est. 02/18/20 8:00:00 CST, Order for future visit, HTN - Hypertension, [facility]  Reviewed most recent labs and imaging studies with patient; questions answered; voiced understanding of all teaching today.   Problem List/Past Medical History  Ongoing  BMI 25.0-25.9,adult  COPD exacerbation  DJD (degenerative joint disease) of cervical spine  DJD (degenerative joint disease), lumbar  Elevated PSA  GERD without esophagitis  HTN - Hypertension  Knee osteoarthritis  Peripheral neuropathy  Prostate cancer  Tobacco use  Vitamin D deficiency  Wellness examination  Historical  No qualifying data  Procedure/Surgical History  Biopsy, prostate; incisional, any approach (08/20/2019)  Excision of Prostate, Open Approach, Diagnostic (08/20/2019)  Ultrasonography of Prostate and Seminal Vesicles (08/20/2019)  muscle tissue removed from forehead (08/31/2018)  PROSTATE SURG    Medications  albuterol CFC free 90 mcg/inh inhalation aerosol with adapter, 2 puff(s), INH, QID, PRN, 12 refills  Augmentin 875 mg-125 mg oral tablet, 1 tab(s), Oral, q12hr  cholecalciferol 1000 intl units oral tablet, 1000 IntUnit= 1 tab(s), Oral, Daily  hydrochlorothiazide-lisinopril 25 mg-20 mg oral tablet, 1 tab(s), Oral, Daily, 6 refills  indomethacin 25 mg oral capsule, 25 mg= 1 cap(s), Oral, BID, PRN, 2 refills  Outpatient Physical Therapy, See Instructions  prednisONE 20 mg oral tablet, 20 mg= 1 tab(s), Oral, Daily  Prilosec 40 mg oral DR capsule, 40 mg= 1 cap(s), Oral, Daily, 3 refills  tamsulosin 0.4 mg oral capsule, 0.4 mg= 1 cap(s), Oral, qPM, 5 refills  Tessalon Perles 100 mg oral capsule, 100 mg= 1 cap(s), Oral, q8hr, PRN  Allergies  minocycline?(Rash)  Social History  Abuse/Neglect  No, No, Yes, 11/15/2019  Alcohol  Current, Beer, 1-2 times per month, Alcohol use interferes with work or home: No. Others hurt by drinking: No. Household alcohol concerns: No., 11/15/2019  Employment/School  Employed, 09/19/2018  Exercise  Exercise type: Walking., 01/23/2019  Home/Environment  Lives with Significant other., 09/19/2018  Nutrition/Health  Regular, 01/23/2019  Sexual  Sexual orientation: Straight or heterosexual. Gender Identity Identifies as male., 01/23/2019  Spiritual/Cultural  [employer], 09/30/2019  Substance Use  Never, 07/06/2017  Tobacco  4 or less cigarettes(less than 1/4 pack)/day in last 30 days, No, 11/15/2019  Family History  Cancer: Mother and Sister.  Tobacco user: Brother.  Immunizations  Vaccine Date Status   influenza virus vaccine, inactivated 12/20/2018 Given   tetanus/diphtheria/pertussis, acel(Tdap) 07/11/2018 Given   Health Maintenance  Health Maintenance  ???Pending?(in the next year)  ??? ??OverDue  ??? ? ? ?Diabetes Screening due??and every?  ??? ??Due?  ??? ? ? ?COPD Maintenance-Pulmonary Rehab Education due??11/15/19??and every 1??year(s)  ??? ? ? ?COPD Maintenance-Spirometry  due??11/15/19??and every?  ??? ? ? ?Colorectal Screening due??11/15/19??and every?  ??? ? ? ?Influenza Vaccine due??11/15/19??and every?  ??? ??Due In Future?  ??? ? ? ?Alcohol Misuse Screening not due until??01/01/20??and every 1??year(s)  ??? ? ? ?Obesity Screening not due until??01/01/20??and every 1??year(s)  ??? ? ? ?Hypertension Management-BMP not due until??08/30/20??and every 1??year(s)  ??? ? ? ?ADL Screening not due until??08/30/20??and every 1??year(s)  ??? ? ? ?COPD Management-Oxygen Assessment not due until??10/14/20??and every 1??year(s)  ??? ? ? ?Blood Pressure Screening not due until??11/14/20??and every 1??year(s)  ??? ? ? ?Body Mass Index Check not due until??11/14/20??and every 1??year(s)  ??? ? ? ?Depression Screening not due until??11/14/20??and every 1??year(s)  ??? ? ? ?Hypertension Management-Blood Pressure not due until??11/14/20??and every 1??year(s)  ???Satisfied?(in the past 1 year)  ??? ??Satisfied?  ??? ? ? ?ADL Screening on??08/30/19.??Satisfied by Boast LPN, Jasmine  ??? ? ? ?Alcohol Misuse Screening on??08/23/19.??Satisfied by Sofya Way LPN  ??? ? ? ?Aspirin Therapy for CVD Prevention on??11/15/19.??Satisfied by Unique Lopez??Reason: Expectation Satisfied Elsewhere  ??? ? ? ?Blood Pressure Screening on??11/15/19.??Satisfied by Sarah Ackerman LPN  ??? ? ? ?Body Mass Index Check on??11/15/19.??Satisfied by Sarah Ackerman LPN  ??? ? ? ?COPD Management-COPD Medications Prescribed on??11/15/19.??Satisfied by Unique Lopez  ??? ? ? ?COPD Management-Oxygen Assessment on??10/14/19.??Satisfied by Lacie Lozano RN  ??? ? ? ?Depression Screening on??11/15/19.??Satisfied by Sarah Ackerman LPN  ??? ? ? ?Diabetes Screening on??08/31/19.??Satisfied by Sarah Chaparro  ??? ? ? ?Hypertension Management-Education on??11/15/19.??Satisfied by Unique Lopez??Reason: Expectation Satisfied Elsewhere  ??? ? ? ?Influenza Vaccine on??12/20/18.??Satisfied by Swati AVILEZ,  Niraj LYONS.  ??? ? ? ?Obesity Screening on??11/15/19.??Satisfied by Sarah Ackerman LPN  ?

## 2022-05-04 NOTE — HISTORICAL [facility]ER
This is a historical note converted from [employer]. Formatting and pictures may have been removed.  Please reference Cerevie for original formatting and attached multimedia. Chief Complaint  Right shoulder pain, R and L hand pain , knee and back pain, reports lots of fatigue  History of Present Illness  12/20/2018 visit: 58 year old male here today for F/U. Pt has been having numbness through bilateral hands and up arm with tingling in his chest. Pt reports being sluggish time several weeks and not feeling right and not able to sleep. ?EKG ordered on visit,?EKG Nonspecific T wave also on previous EKG 04/10/2017, troponin level ordered, Troponin level?is normal, Stress echo ordered. Pt reports general not well feeling. PSA decreased from 5.7-5.0.? Patient has a follow-up with urology in June instructed patient to continue Flomax and follow-up with urology for his follow-up appointment. ?Patient has chronic GERD and takes PPI daily?reports when he?eats?spicy foods it continues to hurt abdomen so he avoids spicy foods. ?Patients blood work patient had low calcium level instructed patient to  calcium from pharmacy to take calcium twice a day. ?Patients blood pressure is elevated today?patient?has been out of medication for several weeks and is to start a medication 2 days back. [1]  ?  59 year old male here today for F/U. HTN at goal.? Patient today reports continued numbness to bilateral hands.? Patient had x-ray 5/16/2017?which showed IMPRESSION:  1. Chronic disc degeneration in the cervical spine from C4 through C7 described above. No evidence of acute injury is appreciated.? Patient also reports chronic fatigue?with difficulty moving in multiple?joints with pain?including hands knees feet and ankles.? Patient has had chronic pain in all joints?and severe fatigue for over 2 years.? January 2019 patient had a stress echo?that was negative for any ischemic changes.  Review of Systems  All Systems Negative Except:  See HPI  Physical Exam  Vitals & Measurements  T:?36.5? ?C (Oral)? HR:?60(Peripheral)? RR:?20? BP:?132/83?  HT:?175.0?cm? WT:?76.1?kg? BMI:?24.85?  General:? Alert and oriented, No acute distress, General health good  Eye:? Pupils are equal, round and reactive to light, Normal conjunctiva.?  HENT:? Normocephalic, Normal hearing, Oral mucosa is moist, No pharyngeal erythema.?  Neck:? Supple, Non-tender, No carotid bruit, No jugular venous distention, No lymphadenopathy, No thyromegaly.?  Respiratory:? Lungs are clear to auscultation, Respirations are non-labored, Breath sounds are equal, Symmetrical chest wall expansion.?  Cardiovascular:? Normal rate, Regular rhythm, No murmur, Good pulses equal in all extremities, Normal peripheral perfusion, No edema.?  Gastrointestinal:? Soft, Non-tender, Non-distended, Normal bowel sounds, No organomegaly.?  Musculoskeletal: Normal range of motion, Normal strength, No tenderness, No deformity, Normal gait.?  Integumentary:? Warm, Dry.?  Neurologic:? Alert, Oriented.?  Psychiatric:? Cooperative, Appropriate mood & affect.?  ?  ?  ?  Assessment/Plan  Chronic joint pain?M25.50,?Chronic joint pain?M25.50,?Chronic joint pain?M25.50,?Chronic joint pain?M25.50,?Chronic joint pain?M25.50,?Chronic joint pain?M25.50  R/O autoimmune  Treat pending labs  Ordered:  Anti-Nuclear(ABDOUL) IgG Ab w/ Rflx to [id number], Routine collect, 06/20/19 9:17:00 CDT, Blood, Stop date 06/20/19 9:17:00 CDT, Lab Collect, Venous Draw, Fatigue  Chronic joint pain, Print Label By Order Location, 06/20/19 8:43:00 CDT  Clinic Follow up, *Est. 12/20/19 3:00:00 CST, 6 month F/U, Order for future visit, Uncontrolled hypertension  Chronic joint pain  Fatigue, [facility]  CRP, Routine collect, 06/20/19 9:17:00 CDT, Blood, Stop date 06/20/19 9:17:00 CDT, Lab Collect, Venous Draw, Fatigue  Chronic joint pain, Print Label By Order Location, 06/20/19 8:43:00 CDT  Cyclic Citrullinated Peptid IgG [id number],  Routine collect, 06/20/19 9:17:00 CDT, Blood, Stop date 06/20/19 9:17:00 CDT, Lab Collect, Venous Draw, Fatigue  Chronic joint pain, Print Label By Order Location, 06/20/19 8:43:00 CDT  Rheumatoid Factor Quantitative, Routine collect, 06/20/19 9:17:00 CDT, Blood, Stop date 06/20/19 9:17:00 CDT, Lab Collect, Venous Draw, Fatigue  Chronic joint pain, Print Label By Order Location, 06/20/19 8:43:00 CDT  Sedimentation Rate, Routine collect, 06/20/19 9:17:00 CDT, Blood, Stop date 06/20/19 9:17:00 CDT, Lab Collect, Venous Draw, Fatigue  Chronic joint pain, Print Label By Order Location, 06/20/19 8:43:00 CDT  ?  Fatigue?R53.83,?Fatigue?R53.83,?Fatigue?R53.83,?Fatigue?R53.83,?Fatigue?R53.83,?Fatigue?R53.83,?Fatigue?R53.83  r/o autoimmune  Treat pending labs  Ordered:  Anti-Nuclear(ABDOUL) IgG Ab w/ Rflx to [id number], Routine collect, 06/20/19 9:17:00 CDT, Blood, Stop date 06/20/19 9:17:00 CDT, Lab Collect, Venous Draw, Fatigue  Chronic joint pain, Print Label By Order Location, 06/20/19 8:43:00 CDT  Clinic Follow up, *Est. 12/20/19 3:00:00 CST, 6 month F/U, Order for future visit, Uncontrolled hypertension  Chronic joint pain  Fatigue, [facility]  CRP, Routine collect, 06/20/19 9:17:00 CDT, Blood, Stop date 06/20/19 9:17:00 CDT, Lab Collect, Venous Draw, Fatigue  Chronic joint pain, Print Label By Order Location, 06/20/19 8:43:00 CDT  Cyclic Citrullinated Peptid IgG [id number], Routine collect, 06/20/19 9:17:00 CDT, Blood, Stop date 06/20/19 9:17:00 CDT, Lab Collect, Venous Draw, Fatigue  Chronic joint pain, Print Label By Order Location, 06/20/19 8:43:00 CDT  Rheumatoid Factor Quantitative, Routine collect, 06/20/19 9:17:00 CDT, Blood, Stop date 06/20/19 9:17:00 CDT, Lab Collect, Venous Draw, Fatigue  Chronic joint pain, Print Label By Order Location, 06/20/19 8:43:00 CDT  Sedimentation Rate, Routine collect, 06/20/19 9:17:00 CDT, Blood, Stop date 06/20/19 9:17:00 CDT, Lab Collect, Venous Draw, Fatigue   Chronic joint pain, Print Label By Order Location, 06/20/19 8:43:00 CDT  Vitamin B12 Level, Routine collect, 06/20/19 9:17:00 CDT, Blood, Stop date 06/20/19 9:17:00 CDT, Lab Collect, Venous Draw, Fatigue, Print Label By Order Location, 06/20/19 8:55:00 CDT  ?  Hand numbness?R20.0  X-ray cervical spine today  Neck?stretches daily  Avoid activities that make pain and stiffness worse  Heating pad, Ice pack, and Biofreeze?as needed alternate every 15-20?minutes  Massage neck to loosen muscles  ?  HTN (hypertension)?I10  ?At goal  Low sodium diet (Dash Diet)  Monitor Blood daily, report any consistent numbers greater than 140/90  RTC 6 month  Maintain healthy weight  Ordered:  hydrochlorothiazide-lisinopril, 1 tab(s), Oral, Daily, # 30 tab(s), 6 Refill(s), [id number]  Clinic Follow up, *Est. 12/20/19 3:00:00 CST, 6 month F/U, Order for future visit, Uncontrolled hypertension  Chronic joint pain  Fatigue, [facility]  ?   Problem List/Past Medical History  Ongoing  Elevated PSA  HTN - Hypertension  Tobacco use  Historical  No qualifying data  Procedure/Surgical History  muscle tissue removed from forehead (08/31/2018)   Medications  albuterol CFC free 90 mcg/inh inhalation aerosol with adapter, 2 puff(s), INH, QID, PRN, 3 refills  hydrochlorothiazide-lisinopril 25 mg-20 mg oral tablet, 1 tab(s), Oral, Daily, 3 refills,? ?Still taking, not as prescribed: Last Dose Date/Time Unknown  IBUPROFEN 800 MG TABS  Prilosec 40 mg oral DR capsule, 40 mg= 1 cap(s), Oral, Daily, 3 refills  tamsulosin 0.4 mg oral capsule, 0.4 mg= 1 cap(s), Oral, qPM, 11 refills  Allergies  No Known Allergies  Social History  Alcohol  Current, Beer, 1-2 times per month, 3 drinks/episode average., 09/19/2018  Employment/School  Employed, 09/19/2018  Exercise  Exercise type: Walking., 01/23/2019  Home/Environment  Lives with Significant other., 09/19/2018  Nutrition/Health  Regular, 01/23/2019  Sexual  Sexual orientation: Straight  or heterosexual. Gender Identity Identifies as male., 01/23/2019  Substance Use  Never, 07/06/2017  Tobacco  4 or less cigarettes(less than 1/4 pack)/day in last 30 days, N/A, 06/20/2019  4 or less cigarettes(less than 1/4 pack)/day in last 30 days, N/A, 06/20/2019  Family History  Cancer: Mother and Sister.  Tobacco user: Brother.  Immunizations  Vaccine Date Status   influenza virus vaccine, inactivated 12/20/2018 Given   tetanus/diphtheria/pertussis, acel(Tdap) 07/11/2018 Given   Health Maintenance  Health Maintenance  ???Pending?(in the next year)  ??? ??OverDue  ??? ? ? ?Diabetes Screening due??and every?  ??? ? ? ?Alcohol Misuse Screening due??01/01/19??and every 1??year(s)  ??? ??Due?  ??? ? ? ?ADL Screening due??06/20/19??and every 1??year(s)  ??? ? ? ?Aspirin Therapy for CVD Prevention due??06/20/19??and every 1??year(s)  ??? ? ? ?Colorectal Screening due??06/20/19??and every?  ??? ? ? ?Hypertension Management-Education due??06/20/19??and every 1??year(s)  ??? ??Due In Future?  ??? ? ? ?Blood Pressure Screening not due until??12/20/19??and every 1??year(s)  ??? ? ? ?Depression Screening not due until??12/20/19??and every 1??year(s)  ??? ? ? ?Hypertension Management-Blood Pressure not due until??12/20/19??and every 1??year(s)  ??? ? ? ?Obesity Screening not due until??01/01/20??and every 1??year(s)  ??? ? ? ?Body Mass Index Check not due until??01/23/20??and every 1??year(s)  ??? ? ? ?Hypertension Management-BMP not due until??01/23/20??and every 1??year(s)  ???Satisfied?(in the past 1 year)  ??? ??Satisfied?  ??? ? ? ?Blood Pressure Screening on??01/23/19.??Satisfied by Kiah AVILES, Sherlyn Pereira  ??? ? ? ?Body Mass Index Check on??01/23/19.??Satisfied by Gabriella Pak RN  ??? ? ? ?Depression Screening on??06/20/19.??Satisfied by Niraj Longoria LPN  ??? ? ? ?Diabetes Screening on??01/23/19.??Satisfied by Swati Garcia  ??? ? ? ?Hypertension Management-Blood Pressure on??01/23/19.??Satisfied by  Sherlyn Dupont RN  ??? ? ? ?Influenza Vaccine on??12/20/18.??Satisfied by Niraj Longoria LPN  ??? ? ? ?Lipid Screening on??07/12/18.??Satisfied by Jerson Payton  ??? ? ? ?Obesity Screening on??01/23/19.??Satisfied by Gabriella Pak RN  ??? ? ? ?Smoking Cessation (Coronary Artery Disease) on??09/19/18.??Satisfied by Gabriella Pak RN.  ??? ? ? ?Tetanus Vaccine on??07/11/18.??Satisfied by Ember Coronel LPN  ?  ?  Diagnostic Results  (05/16/2017 12:10 CDT XR Spine Cervical 2 or 3 Views)  * Final Report *  ?  Reason For Exam  numbness to hands;Numbness  ?  Radiology Report  Cervical spine AP and lateral:  ?  INDICATION: Numbness in hands  ?  FINDINGS: Alignment is normal. There is mild degenerative disc space  narrowing at C5-6 and degenerative anterior osteophyte formation from  C4 through C7, most prominent at C5-C6. Vertebral body heights are  maintained. No fractures or prevertebral soft tissue swelling are  identified. The odontoid process is intact.  ?  ?  Skull series:  ?  INDICATION: Reported lumps on forehead  ?  FINDINGS: A skull series consisting of AP, lateral and Brady views of  the skull was submitted. No skull fractures or focal bone lesions are  identified. The sella appears normal on the lateral view. Paranasal  sinuses are well aerated and symmetrical. No radiopaque foreign bodies  are visible.  ?  ?IMPRESSION:  1. Chronic disc degeneration in the cervical spine from C4 through C7  described above. No evidence of acute injury is appreciated.  2. Normal skull series  ?  ?  Signature Line  Electronically Signed By: Nolan Jung MD  Date/Time Signed: 05/16/2017 14:20  ?  ?  This document has an image  ?  Result type:???????  XR Spine Cervical 2 or 3 Views  Result date:???????  May 16, 2017 12:10 CDT  Result status:???????  Auth (Verified)  Result title:???????  XR Spine Cervical 2 or 3 Views  Performed by:???????  Nolan Jung MD on May 16, 2017 14:16  CDT  Verified by:???????  Nolan Jung MD on May 16, 2017 14:20 CDT  Encounter info:???????  [id number], [facility], Clinic Visit, 5/16/2017 - 5/16/2017  ? [2]     [1]?Office Visit Note; Krystyna Ruelas NP 12/20/2018 08:50 CST  [2]?XR Spine Cervical 2 or 3 Views; Nolan Jung MD 05/16/2017 12:10 CDT

## 2022-05-04 NOTE — HISTORICAL [facility] CERNER
This is a historical note converted from Lala. Formatting and pictures may have been removed.  Please reference Cerevie for original formatting and attached multimedia. Chief Complaint  Three month follow up; elevated PSA  History of Present Illness  P hx of elevated PSA 5.7 then 5.0 in 2018.? I do not see a recent in our system.  ?   Underwent TRUS:  1. Right prostate, Base, Needle core biopsy:  - Benign prostate tissue.  ?  2. Right prostate, Mid, Needle core biopsy:  - Benign prostate tissue.  ?  3. Right prostate, Gateway, Needle core biopsy:  - Prostate Adenocarcinoma, Una Grade 3 + 3 = Score 6 in one (1) of two (2) needle core biopsies representing approximately 15% of the needle core tissue.?  ?  4. Left prostate, Base, Needle core biopsy:  - Prostate Adenocarcinoma, Una Grade 3 + 4 = Score 7 in two (2) of two (2) needle core biopsies representing approximately 15% of the needle core tissue.  ?  5. Left prostate, Mid, Needle core biopsy:  - Benign prostate tissue.  ?  6. Left prostate, Gateway, Needle core biopsy:  - High-grade prostate intraepithelial neoplasia (HGPIN).  ?   Referred to [facility] for RALP which underwent in Feb.? Still having some pain.? Kohli out.? Some MICHEL and ED still to be expected.? Doing?kegels.  ?  Review of Systems  12 point ROS negative other than HPI  Physical Exam  GEN: WNWD NAD  HEENT: NCAT  CV: RRR  RESP: unlabored  ABD: soft NT/ND, inc healed  : NEMG  EXT: no C/C/E  NEURO: AAOx4 no focal deficits  Assessment/Plan  1.?Elevated PSA?R97.20  -S/p RALP.? PSA today and yearly  -Kegels  -Viagra  ?   Problem List/Past Medical History  Ongoing  Asthma  BMI 25.0-25.9,adult  Chronic back pain  Chronic cough  Chronic neck pain  Cocaine use  COPD exacerbation  COPD without exacerbation  DJD (degenerative joint disease) of cervical spine  DJD (degenerative joint disease), lumbar  Elevated PSA  GERD without esophagitis  HTN - Hypertension  Hx of carcinoma in situ of prostate  Knee effusion,  left  Knee osteoarthritis  Knee pain, left  Peripheral neuropathy  Tobacco use  Vitamin D deficiency  Wellness examination  Historical  Prostate cancer  Procedure/Surgical History  Biopsy, prostate; incisional, any approach (08/20/2019)  Excision of Prostate, Open Approach, Diagnostic (08/20/2019)  Ultrasonography of Prostate and Seminal Vesicles (08/20/2019)  muscle tissue removed from forehead (08/31/2018)  PROSTATE SURG   Medications  Advair Diskus 250 mcg-50 mcg inhalation powder, 1 inh, INH, BID, 11 refills  albuterol CFC free 90 mcg/inh inhalation aerosol with adapter, 2 puff(s), INH, QID, PRN, 12 refills  amitriptyline 10 mg oral tablet, 10 mg= 1 tab(s), Oral, Once a day (at bedtime), 2 refills  amlodipine 5 mg oral tablet, 5 mg= 1 tab(s), Oral, Daily, 5 refills  calcium carbonate 600 mg oral tablet, 1200 mg= 2 tab(s), Oral, Daily  hydrochlorothiazide-lisinopril 25 mg-20 mg oral tablet, 1 tab(s), Oral, Daily, 6 refills  meloxicam 15 mg oral tablet, 15 mg= 1 tab(s), Oral, Daily, 5 refills  nicotine 2 mg oral transmucosal gum, 2 mg= 1 EA, Chewed, q2hr, PRN, 1 refills  Outpatient Physical Therapy, See Instructions,? ?Unable to obtain  Prilosec 40 mg oral DR capsule, 40 mg= 1 cap(s), Oral, Daily, 3 refills  sildenafil 100 mg oral tablet, 100 mg= 1 tab(s), Oral, Daily, PRN, 5 refills,? ?Not taking  Spiriva HandiHaler 18 mcg inhalation capsule, 18 mcg= 1 cap(s), INH, Daily, 11 refills  tamsulosin 0.4 mg oral capsule, 0.4 mg= 1 cap(s), Oral, qPM, 5 refills  Vitamin D2 2000 intl units oral capsule, 2000 IntUnit= 1 cap(s), Oral, Daily  Allergies  minocycline?(Rash)  Social History  Abuse/Neglect  No, No, Yes, 07/17/2020  Alcohol  Current, Beer, 1-2 times per week, 07/17/2020  Employment/School  Unemployed, 11/27/2019  Exercise  Exercise type: Walking., 01/23/2019  Home/Environment  Lives with Significant other., 09/19/2018  Nutrition/Health  Regular, 01/23/2019  Sexual  Sexual orientation: Straight or heterosexual.  Gender Identity Identifies as male., 01/23/2019  Spiritual/Cultural  Quaker, 09/30/2019  Substance Use  Never, 07/06/2017  Tobacco  4 or less cigarettes(less than 1/4 pack)/day in last 30 days, No, 07/17/2020  Family History  Cancer: Mother and Sister.  Tobacco user: Brother.  Immunizations  Vaccine Date Status   influenza virus vaccine, inactivated 12/20/2018 Given   tetanus/diphtheria/pertussis, acel(Tdap) 07/11/2018 Given   Health Maintenance  Health Maintenance  ???Pending?(in the next year)  ??? ??OverDue  ??? ? ? ?COPD Maintenance-Spirometry due??and every?  ??? ??Due?  ??? ? ? ?Asthma Management-Asthma Education due??07/27/20??and every 6??month(s)  ??? ? ? ?Asthma Management-[place] Flow due??07/27/20??Variable frequency  ??? ? ? ?Asthma Management-Written Action Plan due??07/27/20??and every 6??month(s)  ??? ? ? ?COPD Maintenance-Pulmonary Rehab Education due??07/27/20??and every 1??year(s)  ??? ? ? ?Influenza Vaccine due??07/27/20??and every?  ??? ? ? ?Zoster Vaccine due??07/27/20??and every?  ??? ??Due In Future?  ??? ? ? ?Aspirin Therapy for CVD Prevention not due until??11/15/20??and every 1??year(s)  ??? ? ? ?Hypertension Management-Education not due until??11/15/20??and every 1??year(s)  ??? ? ? ?Colorectal Screening not due until??11/24/20??and every 1??year(s)  ??? ? ? ?Obesity Screening not due until??01/01/21??and every 1??year(s)  ??? ? ? ?Alcohol Misuse Screening not due until??01/02/21??and every 1??year(s)  ??? ? ? ?Diabetes Screening not due until??05/28/21??and every 1??year(s)  ??? ? ? ?Hypertension Management-BMP not due until??05/28/21??and every 1??year(s)  ??? ? ? ?Asthma Management-Spirometry not due until??07/01/21??and every 1??year(s)  ??? ? ? ?COPD Management-Oxygen Assessment not due until??07/06/21??and every 1??year(s)  ??? ? ? ?Blood Pressure Screening not due until??07/17/21??and every 1??year(s)  ??? ? ? ?Body Mass Index Check not due until??07/17/21??and every  1??year(s)  ??? ? ? ?Depression Screening not due until??07/17/21??and every 1??year(s)  ??? ? ? ?Hypertension Management-Blood Pressure not due until??07/17/21??and every 1??year(s)  ??? ? ? ?ADL Screening not due until??07/17/21??and every 1??year(s)  ??? ? ? ?COPD Management-COPD Medications Prescribed not due until??07/17/21??and every 1??year(s)  ???Satisfied?(in the past 1 year)  ??? ??Satisfied?  ??? ? ? ?ADL Screening on??07/17/20.??Satisfied by Sarah Ackerman LPN  ??? ? ? ?Alcohol Misuse Screening on??06/01/20.??Satisfied by Kimberly Lopeza DEBORAH  ??? ? ? ?Aspirin Therapy for CVD Prevention on??11/15/19.??Satisfied by Unique Lopez??Reason: Expectation Satisfied Elsewhere  ??? ? ? ?Asthma Management-Spirometry on??07/01/20.??Satisfied by Radha Mayer  ??? ? ? ?Asthma Management-Asthma Medication Prescribed on??06/01/20.??Satisfied by Leigh Ann Lopeznda W  ??? ? ? ?Blood Pressure Screening on??07/17/20.??Satisfied by Sarah Ackerman LPN  ??? ? ? ?Body Mass Index Check on??07/17/20.??Satisfied by Sarah Ackerman LPN  ??? ? ? ?COPD Maintenance-Spirometry on??07/01/20.??Satisfied by Radha Mayer  ??? ? ? ?COPD Management-COPD Medications Prescribed on??07/17/20.??Satisfied by Kimberly Lopeza DEBORAH  ??? ? ? ?COPD Management-Oxygen Assessment on??07/06/20.??Satisfied by Kinjal Muniz LPN  ??? ? ? ?Colorectal Screening on??11/25/19.??Satisfied by Jordy, Latrice A.  ??? ? ? ?Depression Screening on??07/17/20.??Satisfied by Sarah Ackerman LPN  ??? ? ? ?Diabetes Screening on??05/28/20.??Satisfied by Augustine Vásquez Jr.  ??? ? ? ?Hypertension Management-Blood Pressure on??07/17/20.??Satisfied by Sarah Ackerman LPN  ??? ? ? ?Hypertension Management-BMP on??05/28/20.??Satisfied by Augustine Vásquez Jr.  ??? ? ? ?Hypertension Management-Education on??11/15/19.??Satisfied by Unique Lopez??Reason: Expectation Satisfied Elsewhere  ??? ? ? ?Lipid Screening  on??05/28/20.??Satisfied by Fahad Benson, Augustine Lopez  ??? ? ? ?Obesity Screening on??07/17/20.??Satisfied by Sarah Ackerman LPN  ?      Date of Service is 7/14/2020   Date of Service is 07/06/2020

## 2022-05-04 NOTE — HISTORICAL [facility]ER
This is a historical note converted from [employer]. Formatting and pictures may have been removed.  Please reference [employer] for original formatting and attached multimedia. Chief Complaint  biopsy on 8/20. body aches, chills, headache, and dizzy spells x1 day  History of Present Illness  59 year old AA male with complaints of chills, headache and dizziness x 1 day. Patient recently underwent a prostate biopsy on 08/20/19 by Dr. Chuck Rudolph (patient has not received results of prostate biopsy; pathology revealed prostate adenocarcinoma). No documented fever; no dysuria or urethral discharge or bleeding. Denies abdominal pain, nausea or vomiting. No abdominal pain reported. + Rectal pressure; denies rectal bleeding or discharge.  Took tylenol for pain earlier today. Patient states he was placed on medication by Dr. Rudolph post treatment but unsure what kind. I reviewed patients record from day of procedure (Cipro and Rocephin).  Review of Systems  GENERAL: Negative except as stated?in HPI  CV: Negative except as stated in HPI  RESP: Negative except as stated?in HPI  GI: Negative?except as stated in?HPI  : Negative?except as stated in?HPI  SKIN: Negative?except as stated in?HPI  Neuro: Negative?except as stated in?HPI  MS: Negative?except as stated in?HPI  Psych: Negative?except as stated in?HPI  Physical Exam  Vitals & Measurements  T:?36.9? ?C (Oral)? HR:?100(Peripheral)? RR:?18? BP:?111/69? SpO2:?97%?  HT:?175?cm? WT:?75.6?kg? BMI:?24.69?  Vital Signs reviewed  GENERAL: Alert and oriented; no acute distress.  Head: Normocephalic, atraumatic.  Eyes: Pupils are round,?equal and?reactive to light. Extraocular movements intact. No exophthalmos. Sclera non-icteric.  ENT: Normal appearing oral cavity and posterior pharynx. Normal nasal turbinates bilaterally without drainage or exudate. Tympanic membranes WNLs bilaterally without erythema, effusion or perforation.  Neck: Supple, nontender.? Full ROM. No carotid bruits or  JVD. No palpable thyroid nodules or enlargement. No palpable adenopathy.  CV: Normal rate and rhythm. No murmurs, rubs or gallops. ?No JVD or carotid bruit noted. No pitting edema to extremities or calf tenderness. Normal peripheral pulses and perfusion.  RESP: Respirations even and non labored. BBS clear to auscultation. No accessory muscle use.  GI: Abdomen soft and non distended. Normoactive bowel sounds x 4 quadrants. No tenderness with palpation.  MUSCULOSKELETAL: Full passive and active ROM of all joints. No bony deformities,?joint tenderness or swelling.?Normal gait. No CVA tenderness.  NEURO: Awake, alert and oriented to person, place, time and situation. Cranial nerves II-XII grossly intact.?No focal or sensory deficits noted.  INTEGUMENTARY: Skin warm, dry, and intact. No rashes or?unusual bruising; no pallor.  PSYCH: Appropriate mood and affect; cooperative.  Assessment/Plan  1.?Chills without fever?R68.83,?Chills without fever?R68.83  Urine Dipstick?wth large blood, + nitrite, 2+ protein, trace ketones. CBC?with?elevated white?count of 21.9.  ?   Transfer to ED for further evaluation of chills, body aches and rectal pressure. Report called to Jasmin in ED. TO ED via ??I discussed case with Dr. Phillips who recommends ED evaluation  Ordered:  Manual Diff, Routine collect, 08/23/19 19:10:00 CDT, Blood, Collected, Stop date 08/23/19 19:10:00 CDT, Nurse collect, Chills without fever, 08/23/19 19:01:00 CDT  Urine Culture [id number], Routine collect, 08/23/19 19:10:00 CDT, Urine, Nurse collect, Stop date 08/23/19 19:10:00 CDT, H/O prostate biopsy  Chills without fever  ?  2.?H/O prostate biopsy?Z98.890  Urine Dipstick?with large blood, + nitrite, 2+ protein, trace ketones. CBC?with?elevated white?count of 21.9. I discussed case with Dr. Phillips who recommends ED evaluation  ?   Transfer to ED for further evaluation of chills, body aches and rectal pressure. Report called to Jasmin in ED. TO ED via  WC??  ?  Ordered:  Urine Culture [id number], Routine collect, 08/23/19 19:10:00 CDT, Urine, Nurse collect, Stop date 08/23/19 19:10:00 CDT, H/O prostate biopsy  Chills without fever  ?  Orders:  Routine Spec Collect Venipuncture - Lab blood collect, 08/23/19 19:20:00 CDT, 08/23/19 19:20:00 CDT   Problem List/Past Medical History  Ongoing  Elevated PSA  HTN - Hypertension  Tobacco use  Historical  No qualifying data  Procedure/Surgical History  Biopsy, prostate; incisional, any approach (08/20/2019)  Excision of Prostate, Open Approach, Diagnostic (08/20/2019)  Ultrasonography of Prostate and Seminal Vesicles (08/20/2019)  muscle tissue removed from forehead (08/31/2018)   Medications  albuterol CFC free 90 mcg/inh inhalation aerosol with adapter, 2 puff(s), INH, QID, PRN, 3 refills  hydrochlorothiazide-lisinopril 25 mg-20 mg oral tablet, 1 tab(s), Oral, Daily, 6 refills  Prilosec 40 mg oral DR capsule, 40 mg= 1 cap(s), Oral, Daily, 3 refills  tamsulosin 0.4 mg oral capsule, 0.4 mg= 1 cap(s), Oral, qPM, 1 refills  Allergies  No Known Allergies  Social History  Abuse/Neglect  No, 08/23/2019  Alcohol  Current, Beer, 1-2 times per month, 3 drinks/episode average., 09/19/2018  Employment/School  Employed, 09/19/2018  Exercise  Exercise type: Walking., 01/23/2019  Home/Environment  Lives with Significant other., 09/19/2018  Nutrition/Health  Regular, 01/23/2019  Sexual  Sexual orientation: Straight or heterosexual. Gender Identity Identifies as male., 01/23/2019  Substance Use  Never, 07/06/2017  Tobacco  4 or less cigarettes(less than 1/4 pack)/day in last 30 days, Cigarettes, No, 3 per day., 08/23/2019  Family History  Cancer: Mother and Sister.  Tobacco user: Brother.  Immunizations  Vaccine Date Status   influenza virus vaccine, inactivated 12/20/2018 Given   tetanus/diphtheria/pertussis, acel(Tdap) 07/11/2018 Given   Health Maintenance  Health Maintenance  ???Pending?(in the next year)  ??? ??OverDue  ??? ? ? ?Diabetes  Screening due??and every?  ??? ??Due?  ??? ? ? ?Aspirin Therapy for CVD Prevention due??08/23/19??and every 1??year(s)  ??? ? ? ?Colorectal Screening due??08/23/19??and every?  ??? ? ? ?Hypertension Management-Education due??08/23/19??and every 1??year(s)  ??? ? ? ?Influenza Vaccine due??08/23/19??and every?  ??? ??Due In Future?  ??? ? ? ?Alcohol Misuse Screening not due until??01/01/20??and every 1??year(s)  ??? ? ? ?Obesity Screening not due until??01/01/20??and every 1??year(s)  ??? ? ? ?Hypertension Management-BMP not due until??01/23/20??and every 1??year(s)  ??? ? ? ?Depression Screening not due until??06/19/20??and every 1??year(s)  ??? ? ? ?Blood Pressure Screening not due until??08/22/20??and every 1??year(s)  ??? ? ? ?Body Mass Index Check not due until??08/22/20??and every 1??year(s)  ??? ? ? ?Hypertension Management-Blood Pressure not due until??08/22/20??and every 1??year(s)  ???Satisfied?(in the past 1 year)  ??? ??Satisfied?  ??? ? ? ?ADL Screening on??08/23/19.??Satisfied by Floyd [address] LPNah K  ??? ? ? ?Alcohol Misuse Screening on??08/23/19.??Satisfied by [address] LPNah K  ??? ? ? ?Blood Pressure Screening on??08/23/19.??Satisfied by Seamus AVILEZ Sofya K  ??? ? ? ?Body Mass Index Check on??08/23/19.??Satisfied by [address] LPNah K  ??? ? ? ?Depression Screening on??06/20/19.??Satisfied by Niraj Longoria LPN  ??? ? ? ?Diabetes Screening on??01/23/19.??Satisfied by Swati Garcia  ??? ? ? ?Hypertension Management-Blood Pressure on??08/23/19.??Satisfied by Sofya Way LPN  ??? ? ? ?Influenza Vaccine on??12/20/18.??Satisfied by Niraj Longoria LPN  ??? ? ? ?Obesity Screening on??08/23/19.??Satisfied by Sofya Way LPN  ??? ? ? ?Smoking Cessation (Coronary Artery Disease) on??09/19/18.??Satisfied by Pasha AVILES, Gabriella BERNSTEIN  ?  Lab Results  Test Name Test Result Date/Time   WBC 21.9 x10(3)/mcL (High) 08/23/2019 19:10 CDT   RBC 5.24 x10(6)/mcL 08/23/2019 19:10 CDT   Hgb 15.3  gm/dL 08/23/2019 19:10 CDT   Hct 46.4 % 08/23/2019 19:10 CDT   Platelet 134 x10(3)/mcL 08/23/2019 19:10 CDT   MCV 88.5 fL 08/23/2019 19:10 CDT   MCH 29.2 pg 08/23/2019 19:10 CDT   MCHC 33.0 gm/dL 08/23/2019 19:10 CDT   RDW 15.3 % (High) 08/23/2019 19:10 CDT   MPV 12.4 fL (High) 08/23/2019 19:10 CDT   Abs Neut 16.37 x10(3)/mcL (High) 08/23/2019 19:10 CDT   Urine Color Urine Dipstick Renetta 08/23/2019 18:53 CDT   Urine Appearance Urine Dipstick Clear 08/23/2019 18:53 CDT   pH Urine Dipstick 5.5 08/23/2019 18:53 CDT   Specific Gravity Urine Dipstick Greater than 1.030 08/23/2019 18:53 CDT   Blood Urine Dipstick 3+ Large 08/23/2019 18:53 CDT   Glucose Urine Dipstick Negative 08/23/2019 18:53 CDT   Ketones Urine Dipstick Trace 08/23/2019 18:53 CDT   Protein Urine Dipstick 2+ (100 mg/dl) 08/23/2019 18:53 CDT   Bilirubin Urine Dipstick 1+ Small 08/23/2019 18:53 CDT   Urobilinogen Urine Dipstick 1 mg/dl 08/23/2019 18:53 CDT   Leukocytes Urine Dipstick Negative 08/23/2019 18:53 CDT   Nitrite Urine Dipstick Positive 08/23/2019 18:53 CDT

## 2022-05-04 NOTE — HISTORICAL [facility]
This is a historical note converted from [employer]. Formatting and pictures may have been removed.  Please reference [employer] for original formatting and attached multimedia. Chief Complaint  New pt referral L knee OA  History of Present Illness  59 Years?Male?non-smoker?presents to sports medicine clinic for?initial visit?for?left?knee pain. The patient points to?medial?knee. States his pain interferes with his sleep. He was given some meds by his PCP but is unsure of what they were or if he took them.He states his knee occasionally pops and 1-2x has gotten stuck in extension but after wiggling his knee it regains normal function. Denies recent falls. he does feel somewhat weak in his left leg.  Pt does report he has a pinches nerve in his neck. No knows low back pathology but has low back spasms and reports neuropathy in his toes.  Onset:?2 months  Current pain level:?10 /10 rated as?moderate?without medication  Quality described as: sharp and throbbing  Modifying factors:?worse with activity?especially walking or standing;?stiffness after immobilization;?  Previous treatment:?conservative tx with HEP and medications;?patient was seen in the ED on 10/14/2019 for?knee pain and swelling, diagnosed with OA, sent?home with?7-day course of prednisone and?indomethacin as needed, py has also seen PCP for his knee pain  Previous injuries:?denies  Associated sx:?crepitus/grinding;?numbness/tingling?in toes;?weakness;?no swelling;?no skin changes;?no decrease in ROM  Activity:?sedentary, full ADLs;?pain interferes with function/daily activity mildly  Family hx:?arthritis  Occupation: used to be a  but has been on disability for last 7-8 months for various health issues  Review of Systems  Constitutional: no fever, no chills, no weight loss  CV: no swelling, no edema?  GI: no fecal incontinence  : no urinary retention, no urinary incontinence  Skin: no rash, no wound  Neuro: no numbness/tingling, no weakness, no saddle  anesthesia  MSK: as above  Psych: no depression, no anxiety  Heme/Lymph: no easy bruising, no easy bleeding, no lymphadenopathy  Immuno: no MRSA history  ?  Physical Exam  Vitals & Measurements  T:?36.7? ?C (Oral)? HR:?85(Peripheral)? BP:?134/86?  HT:?175?cm? WT:?77.8?kg? BMI:?25.4?  General:?well developed; well nourished; cooperative  PSYCH: alert and oriented with?appropriate mood and affect  SKIN: inspection and palpation of skin and soft tissue normal; no scars noted on upper/lower extremities  CV: vascular integrity noted; +2 symmetrical pulses, no edema  Resp: Normal work of breathing, quiet breathing  NEURO: sensation intact by light touch; DTRs +2 bilateral and symmetrical  LYMPH: no LAD noted  ?  Leftknee?  Inspection:?  antalgic gait/station;?full weight bearing;??normal?alignment;?+ medial knee?swelling;?no?erythema;?no?bruising;?+ atrophy in quads  Palpation:?non-tender;?Crepitus:?present  ROM:?  Active, passive Flexion (0-140):?70, 100  Active, passive Extension :?-5,0  Strength:??Flexion??4/5, Extension??4/5  Special Tests:  Ballotable Effusion:??positive  Fluid Wave:?positive  Anterior Drawer: Negative  Lachman:?Negative  Pivot Shift: Negative?  Posterior Sag Sign:?Negative  Posterior Drawer:?Negative  Dial Test:?not tested?  Varus Stress:?LCL stable at 0 and 30 degrees?  Valgus Stress:?MCL?stable at 0 and 30 degrees?  Patellar Grind:?positive  Patella Tracking:?normal  Patella Crepitation: ?none  Willie:?Negative  Apley?s Compression:?Negative  Thessaly:??Negative?  Noble Compression: not tested  Zack: not tested?  Neurovascular:?Intact; 2+?distal pulse, sensation intact to light touch  Reflexes:?2+  ?  Rightknee?  Inspection:?  Normal gait/station;?full weight bearing;?normal?alignment;?no swelling;?no?erythema;?no?bruising;?no atrophy or deconditioning noted?  Palpation:?  non-tender;?Crepitus:?present  ROM:?  Active, passive Flexion:?(0-140):?130,130  Active, passive Extension  :?0,0  Strength:??Flexion??5/5, Extension??5/5  Special Tests:  Ballotable Effusion:??Negative  Fluid Wave:?Negative?  Anterior Drawer:Negative??  Lachman:?Negative  Pivot Shift:Negative?  Posterior Sag Sign:?Negative?  Posterior Drawer:?Negative  Dial Test:?not tested?  Varus Stress:?LCL stable at 0 and 30 degrees?  Valgus Stress:?MCL?stable at 0 and 30 degrees?  Patellar Grind:??Negative?  Patella Tracking: ?normal  Patella Crepitation: ?none  Willie:?Negative?  Apley?s Compression:?Negative  Thessaly:??Negative?  Noble Compression:not tested  Azck: not tested?  Neurovascular:?Intact; 2+?distal pulse, sensation intact to light touch  Reflexes:?2+  ?  Straight leg raise positive for parasthesia in toes with 30-45deg of straight leg flexion at hip  ?  Post left knee?CSI and effusion?PE: Procedure tolerated well, patient reports decreased pain down to 4/10, increased range of motion, NVI  Effusion fluid collected:43cc straw colored fluid  Assessment/Plan  Knee effusion, left?M25.462  ?  Knee osteoarthritis?M17.10  Ordered:  XR Knee Left 4 or More Views, Routine, 11/27/19 8:04:00 CST, Inflammation, None, Ambulatory, Rad Type, Knee osteoarthritis, Not Scheduled, 11/27/19 8:04:00 CST  ?  Knee pain, left?M25.562  Ordered:  ?  Tobacco use?Z72.0  ?  Vitamin D deficiency?E55.9  ?  Orders:  ?  ?  Left knee pain likely secondary to osteoarthritis?and resulting?effusion.? We will start him on conservative therapy to include?oral NSAIDs, cane?debility and offloading knee brace. ?He will also be?physical therapy for strengthening as he has obvious quad atrophy.? We will also drain his?knee effusion with assistance of ultrasound for landmark identification?and?give?subsequent CSI to knee?joint.  ?   Discussed with patient diagnosis and treatment recommendations.??Handout given.  Imaging:?radiological studies ordered and independently reviewed by myself; discussed with patient;?pending radiologist interpretation  Treatment plan:  conservative treatment to include topical capsaicin as needed; hot or cold therapy; adequate vitamin C/D intake, cane and knee brace  Procedure:?discussed CSI vs VS injections as treatment options; since conservative measures did not improve symptoms patient consented for?CSI today?after draining of his effusion  Activity:?Activity as tolerated; HEP to included aerobic conditioning with non-painful activity and ROM/STG exercises; proper footwear; assistive device to avoid limping  Therapy:?formal PT/OT ordered?today  Medication:?first line treatment with?OTC anti-inflammatories?like Tylenol and?Mobic prescribed today; medication precautions given.  ?  ?  Recommend patient stop smoking ASAP  Recommend patient?take?prescribed vitamin D?as he has vitamin D?insufficiency on recent labs.  ?  If conservative measures and CSI failed to?improve?patients condition?a?MRI for staging of?knee pathology.  ?   Medications  albuterol CFC free 90 mcg/inh inhalation aerosol with adapter, 2 puff(s), INH, QID, PRN, 12 refills  amoxicillin-clavulanate 875 mg-125 mg oral tablet, 1 tab(s), Oral, BID  benzonatate 100 mg oral capsule, 100 mg= 1 cap(s), Oral, q8hr  cholecalciferol 1000 intl units oral tablet, 1000 IntUnit= 1 tab(s), Oral, Daily  hydrochlorothiazide-lisinopril 25 mg-20 mg oral tablet, 1 tab(s), Oral, Daily, 6 refills  indomethacin 25 mg oral capsule, 25 mg= 1 cap(s), Oral, BID, PRN, 2 refills  levoFLOXacin 500 mg oral tablet, 500 mg= 1 tab(s), Oral, Daily,? ?Not Taking, Completed Rx  minocycline 100 mg oral capsule, 100 mg= 1 cap(s), Oral, q12hr,? ?Not Taking, Completed Rx  Outpatient Physical Therapy, See Instructions  prednisONE 20 mg oral tablet, 20 mg= 1 tab(s), Oral, Daily,? ?Not Taking, Completed Rx  Prilosec 40 mg oral DR capsule, 40 mg= 1 cap(s), Oral, Daily, 3 refills  tamsulosin 0.4 mg oral capsule, 0.4 mg= 1 cap(s), Oral, qPM, 5 refills  Lab Results  Recent vitamin D: 25.  Diagnostic Results  As per my  interpretation this patient has left?knee?mild OA as evidenced by medial joint space narrowing?and osteophytes?on his?medial patella. ?Effusion noted. ?No obvious fractures      Discussed with the fellow at time of visit? Patient chart reviewed. Patient seen and evaluated at time of visit.?HPI, PE, and Assessment and Plan reviewed. Treatment plan is reasonable and appropriate.? All questions were answered.?Compliance with treatment plan is appropriate.  ?Radiology images independently reviewed and agree with radiologist. ?Radiology images independently reviewed and agree with fellow.

## 2022-05-20 RX ORDER — OMEPRAZOLE 40 MG/1
40 CAPSULE, DELAYED RELEASE ORAL DAILY
COMMUNITY
Start: 2022-02-02 | End: 2022-05-20 | Stop reason: SDUPTHER

## 2022-05-20 RX ORDER — OMEPRAZOLE 40 MG/1
40 CAPSULE, DELAYED RELEASE ORAL DAILY
Qty: 90 CAPSULE | Refills: 1 | Status: SHIPPED | OUTPATIENT
Start: 2022-05-20 | End: 2022-10-26 | Stop reason: SDUPTHER

## 2022-08-08 ENCOUNTER — OFFICE VISIT (OUTPATIENT)
Dept: UROLOGY | Facility: CLINIC | Age: 62
End: 2022-08-08
Payer: MEDICARE

## 2022-08-08 VITALS
DIASTOLIC BLOOD PRESSURE: 102 MMHG | RESPIRATION RATE: 18 BRPM | HEIGHT: 69 IN | OXYGEN SATURATION: 96 % | WEIGHT: 174.81 LBS | TEMPERATURE: 98 F | HEART RATE: 61 BPM | BODY MASS INDEX: 25.89 KG/M2 | SYSTOLIC BLOOD PRESSURE: 166 MMHG

## 2022-08-08 DIAGNOSIS — N52.1 ERECTILE DYSFUNCTION DUE TO DISEASES CLASSIFIED ELSEWHERE: ICD-10-CM

## 2022-08-08 DIAGNOSIS — C61 PROSTATE CANCER: ICD-10-CM

## 2022-08-08 PROBLEM — N52.9 ERECTILE DYSFUNCTION: Status: ACTIVE | Noted: 2022-08-08

## 2022-08-08 PROCEDURE — 3008F PR BODY MASS INDEX (BMI) DOCUMENTED: ICD-10-PCS | Mod: CPTII,,, | Performed by: NURSE PRACTITIONER

## 2022-08-08 PROCEDURE — 3008F BODY MASS INDEX DOCD: CPT | Mod: CPTII,,, | Performed by: NURSE PRACTITIONER

## 2022-08-08 PROCEDURE — 99213 PR OFFICE/OUTPT VISIT, EST, LEVL III, 20-29 MIN: ICD-10-PCS | Mod: S$PBB,,, | Performed by: NURSE PRACTITIONER

## 2022-08-08 PROCEDURE — 3077F SYST BP >= 140 MM HG: CPT | Mod: CPTII,,, | Performed by: NURSE PRACTITIONER

## 2022-08-08 PROCEDURE — 3080F DIAST BP >= 90 MM HG: CPT | Mod: CPTII,,, | Performed by: NURSE PRACTITIONER

## 2022-08-08 PROCEDURE — 3080F PR MOST RECENT DIASTOLIC BLOOD PRESSURE >= 90 MM HG: ICD-10-PCS | Mod: CPTII,,, | Performed by: NURSE PRACTITIONER

## 2022-08-08 PROCEDURE — 3077F PR MOST RECENT SYSTOLIC BLOOD PRESSURE >= 140 MM HG: ICD-10-PCS | Mod: CPTII,,, | Performed by: NURSE PRACTITIONER

## 2022-08-08 PROCEDURE — 1159F MED LIST DOCD IN RCRD: CPT | Mod: CPTII,,, | Performed by: NURSE PRACTITIONER

## 2022-08-08 PROCEDURE — 99213 OFFICE O/P EST LOW 20 MIN: CPT | Mod: S$PBB,,, | Performed by: NURSE PRACTITIONER

## 2022-08-08 PROCEDURE — 1160F PR REVIEW ALL MEDS BY PRESCRIBER/CLIN PHARMACIST DOCUMENTED: ICD-10-PCS | Mod: CPTII,,, | Performed by: NURSE PRACTITIONER

## 2022-08-08 PROCEDURE — 1160F RVW MEDS BY RX/DR IN RCRD: CPT | Mod: CPTII,,, | Performed by: NURSE PRACTITIONER

## 2022-08-08 PROCEDURE — 99213 OFFICE O/P EST LOW 20 MIN: CPT | Mod: PBBFAC | Performed by: NURSE PRACTITIONER

## 2022-08-08 PROCEDURE — 1159F PR MEDICATION LIST DOCUMENTED IN MEDICAL RECORD: ICD-10-PCS | Mod: CPTII,,, | Performed by: NURSE PRACTITIONER

## 2022-08-08 RX ORDER — CALCIUM CARBONATE/VITAMIN D3 250-3.125
1 TABLET ORAL DAILY
COMMUNITY
End: 2022-11-07

## 2022-08-08 RX ORDER — ALBUTEROL SULFATE 90 UG/1
AEROSOL, METERED RESPIRATORY (INHALATION)
COMMUNITY
Start: 2022-04-22 | End: 2022-10-26 | Stop reason: SDUPTHER

## 2022-08-08 RX ORDER — CALCIUM CARBONATE 600 MG
600 TABLET ORAL
COMMUNITY

## 2022-08-08 RX ORDER — CETIRIZINE HYDROCHLORIDE 10 MG/1
10 TABLET ORAL
COMMUNITY
Start: 2021-10-28 | End: 2022-10-26 | Stop reason: SDUPTHER

## 2022-08-08 RX ORDER — FLUTICASONE PROPIONATE AND SALMETEROL 50; 250 UG/1; UG/1
POWDER RESPIRATORY (INHALATION)
COMMUNITY
Start: 2022-04-22 | End: 2022-10-26 | Stop reason: SDUPTHER

## 2022-08-08 NOTE — PROGRESS NOTES
Chief Complaint: No chief complaint on file.      HPI:  Patient is a 62-year-old male status post prostatectomy for prostate cancer in February of 2020. Prostate biopsy revealed Dixon 3+3=6, 3+4=7.  Today patient presents with erectile dysfunction unable to attain an erection.  Patient denies  dysuria, urinary urgency, frequency, incontinence, retention, gross hematuria, nocturia.  She was given script for Viagra but he stated the cost was too high could not afford it.      Allergies:  Review of patient's allergies indicates:  No Known Allergies    Medications:  Current Outpatient Medications   Medication Sig Dispense Refill    cetirizine (ZYRTEC) 10 MG tablet Take 10 mg by mouth.      omeprazole (PRILOSEC) 40 MG capsule Take 1 capsule (40 mg total) by mouth once daily. 90 capsule 1    ADVAIR DISKUS 250-50 mcg/dose diskus inhaler       albuterol (PROVENTIL/VENTOLIN HFA) 90 mcg/actuation inhaler       calcium carbonate (OS-BRENT) 600 mg calcium (1,500 mg) Tab Take 600 mg by mouth.      calcium carbonate-vitamin D3 250-125 mg 250 mg-3.125 mcg (125 unit) Tab Take 1 tablet by mouth once daily.       No current facility-administered medications for this visit.       Review of Systems:  General: No fever, chills, fatigability, or weight loss.  Skin: No rashes, itching, or changes in color or texture of skin.  Chest: Denies MCGRATH, cyanosis, wheezing, cough, and sputum production.  Abdomen: Appetite fine. No weight loss. Denies diarrhea, abdominal pain, hematemesis, or blood in stool.  Musculoskeletal: No joint stiffness or swelling. Denies back pain.  : As above.  All other review of systems negative.    PMH:  Past Medical History:   Diagnosis Date    Arthritis     Asthma     Cancer     GERD (gastroesophageal reflux disease)     Hypertension        PSH:  Past Surgical History:   Procedure Laterality Date    PROSTATECTOMY      removal of muscles in forehead         FamHx:  History reviewed. No pertinent family  history.    SocHx:  Social History     Socioeconomic History    Marital status: Single   Tobacco Use    Smoking status: Heavy Tobacco Smoker     Packs/day: 0.50     Types: Cigarettes    Smokeless tobacco: Never Used   Substance and Sexual Activity    Drug use: Never       Physical Exam:  Vitals:    08/08/22 1022   BP: (!) 166/102   Pulse: 61   Resp: 18   Temp: 97.9 °F (36.6 °C)     General: A&Ox3, no apparent distress, no deformities  Neck: No masses, normal thyroid  Lungs: CTA mali, no use of accessory muscles  Heart: RRR, no arrhythmias  Abdomen: Soft, NT, ND, no masses, no hernias, no hepatosplenomegaly  Lymphatic: Neck and groin nodes negative  Skin: The skin is warm and dry. No jaundice.  Ext: No c/c/e.        Labs:  Pending    Imaging:       Impression:  Prostate cancer, erectile dysfunction      Plan: PSA today I will notify patient of results and also treatment regimen for erectile dysfunction.

## 2022-08-08 NOTE — PROGRESS NOTES
Patient seen by Joseph De La Cruz.  Will do labs today and provide will call pt with results and f/u appointment.

## 2022-08-29 ENCOUNTER — TELEPHONE (OUTPATIENT)
Dept: UROLOGY | Facility: CLINIC | Age: 62
End: 2022-08-29
Payer: MEDICARE

## 2022-08-29 NOTE — TELEPHONE ENCOUNTER
"Pt called w/questions about "the stent or the pump" for his erectile dysfunction & what we were going to do about it. Explained to pt that during his last appt w/Dr. Acosta on 08Aug2022 he stated that he could not afford the Viagra prescription & wanted the pump procedure. Dr. Acosta tried to explain that it was not covered by insurance & would be very expensive but pt was adamant. Pt stated he did call the private office but it was too expensive for him to get the procedure. Pt asked why he did not have a follow up appt & I explained that he said he didn't want to come back if we weren't going to help him so he was placed as PRN in our clinic & that his primary care doctor can order his yearly PSA & ED medication if he wants it. Pt stated understanding.  "

## 2022-10-26 ENCOUNTER — OFFICE VISIT (OUTPATIENT)
Dept: INTERNAL MEDICINE | Facility: CLINIC | Age: 62
End: 2022-10-26
Payer: MEDICARE

## 2022-10-26 VITALS
RESPIRATION RATE: 16 BRPM | SYSTOLIC BLOOD PRESSURE: 134 MMHG | WEIGHT: 182 LBS | HEIGHT: 69 IN | BODY MASS INDEX: 26.96 KG/M2 | HEART RATE: 69 BPM | TEMPERATURE: 98 F | DIASTOLIC BLOOD PRESSURE: 88 MMHG

## 2022-10-26 DIAGNOSIS — M17.0 PRIMARY OSTEOARTHRITIS OF BOTH KNEES: ICD-10-CM

## 2022-10-26 DIAGNOSIS — F17.210 CIGARETTE NICOTINE DEPENDENCE WITHOUT COMPLICATION: ICD-10-CM

## 2022-10-26 DIAGNOSIS — J44.9 CHRONIC OBSTRUCTIVE PULMONARY DISEASE, UNSPECIFIED COPD TYPE: ICD-10-CM

## 2022-10-26 DIAGNOSIS — J45.20 MILD INTERMITTENT ASTHMA WITHOUT COMPLICATION: ICD-10-CM

## 2022-10-26 DIAGNOSIS — I10 PRIMARY HYPERTENSION: ICD-10-CM

## 2022-10-26 DIAGNOSIS — Z23 IMMUNIZATION DUE: ICD-10-CM

## 2022-10-26 DIAGNOSIS — Z12.11 COLON CANCER SCREENING: ICD-10-CM

## 2022-10-26 DIAGNOSIS — Z00.00 WELLNESS EXAMINATION: Primary | ICD-10-CM

## 2022-10-26 DIAGNOSIS — E55.9 VITAMIN D DEFICIENCY: ICD-10-CM

## 2022-10-26 DIAGNOSIS — K21.9 GASTROESOPHAGEAL REFLUX DISEASE WITHOUT ESOPHAGITIS: ICD-10-CM

## 2022-10-26 PROBLEM — R97.20 HIGH PROSTATE SPECIFIC ANTIGEN (PSA): Status: ACTIVE | Noted: 2022-10-26

## 2022-10-26 PROBLEM — M47.816 SPONDYLOSIS OF LUMBAR SPINE: Status: ACTIVE | Noted: 2022-10-26

## 2022-10-26 PROBLEM — M54.2 CHRONIC NECK PAIN: Status: ACTIVE | Noted: 2022-10-26

## 2022-10-26 PROBLEM — J45.909 ASTHMA: Status: ACTIVE | Noted: 2022-10-26

## 2022-10-26 PROBLEM — Z86.002 HX OF CARCINOMA IN SITU OF PROSTATE: Status: ACTIVE | Noted: 2022-10-26

## 2022-10-26 PROBLEM — R05.3 CHRONIC COUGH: Status: ACTIVE | Noted: 2022-10-26

## 2022-10-26 PROBLEM — M47.812 SPONDYLOSIS OF CERVICAL SPINE: Status: ACTIVE | Noted: 2022-10-26

## 2022-10-26 PROBLEM — F14.90 COCAINE USE: Status: ACTIVE | Noted: 2022-10-26

## 2022-10-26 PROBLEM — G62.9 PERIPHERAL NERVE DISEASE: Status: ACTIVE | Noted: 2022-10-26

## 2022-10-26 PROBLEM — G89.29 CHRONIC NECK PAIN: Status: ACTIVE | Noted: 2022-10-26

## 2022-10-26 LAB
ALBUMIN SERPL-MCNC: 4 GM/DL (ref 3.4–4.8)
ALBUMIN/GLOB SERPL: 1.3 RATIO (ref 1.1–2)
ALP SERPL-CCNC: 64 UNIT/L (ref 40–150)
ALT SERPL-CCNC: 15 UNIT/L (ref 0–55)
APPEARANCE UR: CLEAR
AST SERPL-CCNC: 16 UNIT/L (ref 5–34)
BACTERIA #/AREA URNS AUTO: ABNORMAL /HPF
BASOPHILS # BLD AUTO: 0.11 X10(3)/MCL (ref 0–0.2)
BASOPHILS NFR BLD AUTO: 0.9 %
BILIRUB UR QL STRIP.AUTO: NEGATIVE MG/DL
BILIRUBIN DIRECT+TOT PNL SERPL-MCNC: 0.5 MG/DL
BUN SERPL-MCNC: 10.2 MG/DL (ref 8.4–25.7)
CALCIUM SERPL-MCNC: 10 MG/DL (ref 8.8–10)
CHLORIDE SERPL-SCNC: 107 MMOL/L (ref 98–107)
CHOLEST SERPL-MCNC: 166 MG/DL
CHOLEST/HDLC SERPL: 4 {RATIO} (ref 0–5)
CO2 SERPL-SCNC: 24 MMOL/L (ref 23–31)
COLOR UR AUTO: ABNORMAL
CREAT SERPL-MCNC: 1.02 MG/DL (ref 0.73–1.18)
DEPRECATED CALCIDIOL+CALCIFEROL SERPL-MC: 31.8 NG/ML (ref 30–80)
EOSINOPHIL # BLD AUTO: 0.23 X10(3)/MCL (ref 0–0.9)
EOSINOPHIL NFR BLD AUTO: 1.8 %
ERYTHROCYTE [DISTWIDTH] IN BLOOD BY AUTOMATED COUNT: 16.4 % (ref 11.5–17)
GFR SERPLBLD CREATININE-BSD FMLA CKD-EPI: >60 MLS/MIN/1.73/M2
GLOBULIN SER-MCNC: 3.2 GM/DL (ref 2.4–3.5)
GLUCOSE SERPL-MCNC: 102 MG/DL (ref 82–115)
GLUCOSE UR QL STRIP.AUTO: NORMAL MG/DL
HCT VFR BLD AUTO: 46.6 % (ref 42–52)
HDLC SERPL-MCNC: 39 MG/DL (ref 35–60)
HGB BLD-MCNC: 15.2 GM/DL (ref 14–18)
HYALINE CASTS #/AREA URNS LPF: ABNORMAL /LPF
IMM GRANULOCYTES # BLD AUTO: 0.05 X10(3)/MCL (ref 0–0.04)
IMM GRANULOCYTES NFR BLD AUTO: 0.4 %
KETONES UR QL STRIP.AUTO: NEGATIVE MG/DL
LDLC SERPL CALC-MCNC: 98 MG/DL (ref 50–140)
LEUKOCYTE ESTERASE UR QL STRIP.AUTO: NEGATIVE UNIT/L
LYMPHOCYTES # BLD AUTO: 2.86 X10(3)/MCL (ref 0.6–4.6)
LYMPHOCYTES NFR BLD AUTO: 22.4 %
MCH RBC QN AUTO: 28 PG (ref 27–31)
MCHC RBC AUTO-ENTMCNC: 32.6 MG/DL (ref 33–36)
MCV RBC AUTO: 86 FL (ref 80–94)
MONOCYTES # BLD AUTO: 0.8 X10(3)/MCL (ref 0.1–1.3)
MONOCYTES NFR BLD AUTO: 6.3 %
MUCOUS THREADS URNS QL MICRO: ABNORMAL /LPF
NEUTROPHILS # BLD AUTO: 8.7 X10(3)/MCL (ref 2.1–9.2)
NEUTROPHILS NFR BLD AUTO: 68.2 %
NITRITE UR QL STRIP.AUTO: NEGATIVE
NRBC BLD AUTO-RTO: 0 %
PH UR STRIP.AUTO: 5 [PH]
PLATELET # BLD AUTO: 170 X10(3)/MCL (ref 130–400)
PLATELETS.RETICULATED NFR BLD AUTO: 19.9 % (ref 0.9–11.2)
PMV BLD AUTO: ABNORMAL FL
POTASSIUM SERPL-SCNC: 3.9 MMOL/L (ref 3.5–5.1)
PROT SERPL-MCNC: 7.2 GM/DL (ref 5.8–7.6)
PROT UR QL STRIP.AUTO: NEGATIVE MG/DL
RBC # BLD AUTO: 5.42 X10(6)/MCL (ref 4.7–6.1)
RBC #/AREA URNS AUTO: ABNORMAL /HPF
RBC UR QL AUTO: NEGATIVE UNIT/L
SODIUM SERPL-SCNC: 140 MMOL/L (ref 136–145)
SP GR UR STRIP.AUTO: 1.02
SQUAMOUS #/AREA URNS LPF: ABNORMAL /HPF
T4 FREE SERPL-MCNC: 1.02 NG/DL (ref 0.7–1.48)
TRIGL SERPL-MCNC: 145 MG/DL (ref 34–140)
TSH SERPL-ACNC: 1.35 UIU/ML (ref 0.35–4.94)
UROBILINOGEN UR STRIP-ACNC: NORMAL MG/DL
VLDLC SERPL CALC-MCNC: 29 MG/DL
WBC # SPEC AUTO: 12.8 X10(3)/MCL (ref 4.5–11.5)
WBC #/AREA URNS AUTO: ABNORMAL /HPF

## 2022-10-26 PROCEDURE — 4010F PR ACE/ARB THEARPY RXD/TAKEN: ICD-10-PCS | Mod: CPTII,,, | Performed by: NURSE PRACTITIONER

## 2022-10-26 PROCEDURE — 36415 COLL VENOUS BLD VENIPUNCTURE: CPT | Performed by: NURSE PRACTITIONER

## 2022-10-26 PROCEDURE — 84443 ASSAY THYROID STIM HORMONE: CPT | Performed by: NURSE PRACTITIONER

## 2022-10-26 PROCEDURE — 3079F PR MOST RECENT DIASTOLIC BLOOD PRESSURE 80-89 MM HG: ICD-10-PCS | Mod: CPTII,,, | Performed by: NURSE PRACTITIONER

## 2022-10-26 PROCEDURE — 1160F RVW MEDS BY RX/DR IN RCRD: CPT | Mod: CPTII,,, | Performed by: NURSE PRACTITIONER

## 2022-10-26 PROCEDURE — 82306 VITAMIN D 25 HYDROXY: CPT | Performed by: NURSE PRACTITIONER

## 2022-10-26 PROCEDURE — 1160F PR REVIEW ALL MEDS BY PRESCRIBER/CLIN PHARMACIST DOCUMENTED: ICD-10-PCS | Mod: CPTII,,, | Performed by: NURSE PRACTITIONER

## 2022-10-26 PROCEDURE — G0008 ADMIN INFLUENZA VIRUS VAC: HCPCS | Mod: PBBFAC

## 2022-10-26 PROCEDURE — 85025 COMPLETE CBC W/AUTO DIFF WBC: CPT | Performed by: NURSE PRACTITIONER

## 2022-10-26 PROCEDURE — 1159F MED LIST DOCD IN RCRD: CPT | Mod: CPTII,,, | Performed by: NURSE PRACTITIONER

## 2022-10-26 PROCEDURE — 3079F DIAST BP 80-89 MM HG: CPT | Mod: CPTII,,, | Performed by: NURSE PRACTITIONER

## 2022-10-26 PROCEDURE — 4010F ACE/ARB THERAPY RXD/TAKEN: CPT | Mod: CPTII,,, | Performed by: NURSE PRACTITIONER

## 2022-10-26 PROCEDURE — 84439 ASSAY OF FREE THYROXINE: CPT | Performed by: NURSE PRACTITIONER

## 2022-10-26 PROCEDURE — 99396 PREV VISIT EST AGE 40-64: CPT | Mod: S$PBB,25,, | Performed by: NURSE PRACTITIONER

## 2022-10-26 PROCEDURE — 3075F PR MOST RECENT SYSTOLIC BLOOD PRESS GE 130-139MM HG: ICD-10-PCS | Mod: CPTII,,, | Performed by: NURSE PRACTITIONER

## 2022-10-26 PROCEDURE — 80061 LIPID PANEL: CPT | Performed by: NURSE PRACTITIONER

## 2022-10-26 PROCEDURE — 99406 PR TOBACCO USE CESSATION INTERMEDIATE 3-10 MINUTES: ICD-10-PCS | Mod: S$PBB,,, | Performed by: NURSE PRACTITIONER

## 2022-10-26 PROCEDURE — 99214 OFFICE O/P EST MOD 30 MIN: CPT | Mod: PBBFAC,25 | Performed by: NURSE PRACTITIONER

## 2022-10-26 PROCEDURE — 81001 URINALYSIS AUTO W/SCOPE: CPT | Performed by: NURSE PRACTITIONER

## 2022-10-26 PROCEDURE — 99396 PR PREVENTIVE VISIT,EST,40-64: ICD-10-PCS | Mod: S$PBB,25,, | Performed by: NURSE PRACTITIONER

## 2022-10-26 PROCEDURE — 99212 PR OFFICE/OUTPT VISIT, EST, LEVL II, 10-19 MIN: ICD-10-PCS | Mod: 25,S$PBB,, | Performed by: NURSE PRACTITIONER

## 2022-10-26 PROCEDURE — 80053 COMPREHEN METABOLIC PANEL: CPT | Performed by: NURSE PRACTITIONER

## 2022-10-26 PROCEDURE — 99406 BEHAV CHNG SMOKING 3-10 MIN: CPT | Mod: S$PBB,,, | Performed by: NURSE PRACTITIONER

## 2022-10-26 PROCEDURE — 99212 OFFICE O/P EST SF 10 MIN: CPT | Mod: 25,S$PBB,, | Performed by: NURSE PRACTITIONER

## 2022-10-26 PROCEDURE — 3075F SYST BP GE 130 - 139MM HG: CPT | Mod: CPTII,,, | Performed by: NURSE PRACTITIONER

## 2022-10-26 PROCEDURE — 1159F PR MEDICATION LIST DOCUMENTED IN MEDICAL RECORD: ICD-10-PCS | Mod: CPTII,,, | Performed by: NURSE PRACTITIONER

## 2022-10-26 RX ORDER — AMLODIPINE BESYLATE 5 MG/1
5 TABLET ORAL DAILY
COMMUNITY
End: 2022-11-07 | Stop reason: SDUPTHER

## 2022-10-26 RX ORDER — SILDENAFIL 100 MG/1
100 TABLET, FILM COATED ORAL
COMMUNITY
Start: 2022-09-26 | End: 2024-01-17

## 2022-10-26 RX ORDER — LISINOPRIL AND HYDROCHLOROTHIAZIDE 20; 25 MG/1; MG/1
1 TABLET ORAL DAILY
COMMUNITY
Start: 2022-08-12 | End: 2022-11-07 | Stop reason: SDUPTHER

## 2022-10-26 RX ORDER — FLUTICASONE PROPIONATE AND SALMETEROL 50; 250 UG/1; UG/1
1 POWDER RESPIRATORY (INHALATION) 2 TIMES DAILY
Qty: 180 EACH | Refills: 3 | Status: SHIPPED | OUTPATIENT
Start: 2022-10-26 | End: 2023-12-19 | Stop reason: SDUPTHER

## 2022-10-26 RX ORDER — DICLOFENAC SODIUM 75 MG/1
75 TABLET, DELAYED RELEASE ORAL 2 TIMES DAILY
Qty: 60 TABLET | Refills: 5 | Status: SHIPPED | OUTPATIENT
Start: 2022-10-26 | End: 2023-04-24

## 2022-10-26 RX ORDER — ALBUTEROL SULFATE 90 UG/1
1-2 AEROSOL, METERED RESPIRATORY (INHALATION)
Qty: 18 G | Refills: 12 | Status: SHIPPED | OUTPATIENT
Start: 2022-10-26 | End: 2023-04-24

## 2022-10-26 RX ORDER — CETIRIZINE HYDROCHLORIDE 10 MG/1
10 TABLET ORAL DAILY
Qty: 90 TABLET | Refills: 3 | Status: SHIPPED | OUTPATIENT
Start: 2022-10-26 | End: 2022-11-07 | Stop reason: SDUPTHER

## 2022-10-26 RX ORDER — OMEPRAZOLE 40 MG/1
40 CAPSULE, DELAYED RELEASE ORAL DAILY
Qty: 90 CAPSULE | Refills: 3 | Status: SHIPPED | OUTPATIENT
Start: 2022-10-26 | End: 2023-12-19 | Stop reason: SDUPTHER

## 2022-10-26 RX ORDER — ALBUTEROL SULFATE 0.83 MG/ML
2.5 SOLUTION RESPIRATORY (INHALATION)
Qty: 25 EACH | Refills: 3 | Status: SHIPPED | OUTPATIENT
Start: 2022-10-26 | End: 2023-12-19 | Stop reason: SDUPTHER

## 2022-10-26 NOTE — ASSESSMENT & PLAN NOTE
RX Advair Diskus 250/50   RX albuterol inhaler prn   Use inhalers as prescribed (rinse mouth after use of steroid inhalers). Use long term inhalers daily and rescue inhaler as needed.  Avoid triggers (high humidity, strong odors, chemical fumes).  Report signs of upper respiratory infection immediately for early treatment.  Flu shot recommended yearly.  Practice abdominal breathing. Eat smaller, more frequent meals.  Smoking Cessation encouraged.

## 2022-10-26 NOTE — ASSESSMENT & PLAN NOTE
RX Diclofenac 75 mg BID  Knee X-rays on Chart  Perform knee stretches daily  Exercise as tolerated (low impact)  Avoid activities that increase the pain or cause stiffness.  Apply heating pad, ice pack, OTC topical as needed.  Massage to loosen muscles.  Continues NSAID as needed  F/U if worsening

## 2022-10-26 NOTE — ASSESSMENT & PLAN NOTE
Educated on increasing foods high in Vitamin D such as fish oil, cod liver oil, salmon, milk fortified with vitamin D.   Vitamin D 2000 I.U. tablets daily (purchase over the counter).  Repeat Vitamin D level as ordered.

## 2022-10-26 NOTE — ASSESSMENT & PLAN NOTE
Med refill next week   Low Sodium Diet (Dash Diet - less than 2 grams of sodium per day).  Maintain healthy weight with goal BMI <30. Exercise 30 minutes per day 5 days per week.

## 2022-10-26 NOTE — PROGRESS NOTES
ABDOULAYE Snell   [facility]  [address] [facility] [address]      PATIENT NAME: Chacorta Sims  : 1960  DATE: 10/26/22  MRN: [id number]      Billing Provider: ABDOULAYE Snell  Level of Service: [place] PREVENTIVE VISIT,EST,40-64  Patient PCP Information       Provider PCP Type    ABDOULAYE Snell General            Reason for Visit / Chief Complaint: Follow-up       History of Present Illness / Problem Focused Workflow     Chacorta Sims presents to the clinic with Follow-up     61 yo AAM here for f/u routine Wellness f/u. PMHx includes prostate cancer (dx 2019), Followed by Dr. 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 30 pack/year smoker.  Drinks 12 pack/beer every few days.     Prostate Cancer Screening - 2020 PSA <0.1 Follow up annually.  Colon Cancer Screening - 10/26/22 Cologuard Ordered  Osteoporosis Screening - 10/26/22 Vit D     10/26/22  Pt here today for routine wellness visit, no labs completed for today's visit, will complete after the visit and f/u next week to discuss results. Pt agreeable to Flu shot today and Cologuard order. Meds reviewed and refilled appropriately. Pt reports with trouble sleeping, she reports that he has tried Seroquel and he reports that it was too strong, does not want any pills, has tried OTC, pt reports that used THC recently and that helped, number to Teleleaf given to patient to contact. Will refill BP med next week once labs reviewed. Denies any other issues today.   Denies chest pain, shortness of breath, cough, fever, headache, dizziness, weakness, abdominal pain, nausea, vomiting, diarrhea, constipation, black/bloody stools, unplanned weight loss, night sweats, changes in urinary  patterns, burning/odor with urination, depression, anxiety, and SI/HI.       Follow-up      Review of Systems     Review of Systems   Constitutional: Negative.    HENT: Negative.     Eyes: Negative.    Respiratory: Negative.     Cardiovascular: Negative.    Gastrointestinal: Negative.    Endocrine: Negative.    Genitourinary: Negative.    Neurological: Negative.    Psychiatric/Behavioral: Negative.       Medical / Social / Family History     Past Medical History:   Diagnosis Date    Arthritis     Asthma     Cancer     GERD (gastroesophageal reflux disease)     Hypertension        Past Surgical History:   Procedure Laterality Date    PROSTATECTOMY      removal of muscles in forehead         Social History    reports that he has been smoking cigarettes. He has been smoking an average of .5 packs per day. He has never used smokeless tobacco. He reports current alcohol use of about 2.0 standard drinks per week. He reports that he does not use drugs.    Family History  's family history includes Cancer in his father and mother.    Medications and Allergies     Medications  Medication List with Changes/Refills   New Medications    ALBUTEROL (PROVENTIL) 2.5 MG /3 ML (0.083 %) NEBULIZER SOLUTION    Take 3 mLs (2.5 mg total) by nebulization every 4 to 6 hours as needed for Wheezing or Shortness of Breath. Rescue    DICLOFENAC (VOLTAREN) 75 MG EC TABLET    Take 1 tablet (75 mg total) by mouth 2 (two) times daily. As needed for knee pain   Current Medications    AMLODIPINE (NORVASC) 5 MG TABLET    Take 5 mg by mouth once daily. For High Blood Pressure    CALCIUM CARBONATE (OS-BRENT) 600 MG CALCIUM (1,500 MG) TAB    Take 600 mg by mouth.    CALCIUM CARBONATE-VITAMIN D3 250-125  MG-3.125 MCG (125 UNIT) TAB    Take 1 tablet by mouth once daily.    LISINOPRIL-HYDROCHLOROTHIAZIDE (PRINZIDE,ZESTORETIC) 20-25 MG TAB    Take 1 tablet by mouth once daily.    SILDENAFIL (VIAGRA) 100 MG TABLET    Take 100 mg by mouth.   Changed  and/or Refilled Medications    Modified Medication Previous Medication    ADVAIR DISKUS 250-50 MCG/DOSE DISKUS INHALER ADVAIR DISKUS 250-50 mcg/dose diskus inhaler       Inhale 1 puff into the lungs 2 (two) times a day. Maintenance Inhaler        ALBUTEROL (PROVENTIL/VENTOLIN HFA) 90 MCG/ACTUATION INHALER albuterol (PROVENTIL/VENTOLIN HFA) 90 mcg/actuation inhaler       Inhale 1-2 puffs into the lungs every 4 to 6 hours as needed for Wheezing or Shortness of Breath. Rescue Inhaler        CETIRIZINE (ZYRTEC) 10 MG TABLET cetirizine (ZYRTEC) 10 MG tablet       Take 1 tablet (10 mg total) by mouth once daily. For allergies / asthma    Take 10 mg by mouth.    OMEPRAZOLE (PRILOSEC) 40 MG CAPSULE omeprazole (PRILOSEC) 40 MG capsule       Take 1 capsule (40 mg total) by mouth once daily. For Acid Reflux    Take 1 capsule (40 mg total) by mouth once daily.         Allergies  Review of patient's allergies indicates:  No Known Allergies    Physical Examination     Vitals:    10/26/22 0932   BP: 134/88   Pulse:    Resp:    Temp:      Physical Exam  Vitals reviewed.   Constitutional:       Appearance: Normal appearance. He is normal weight.   HENT:      Head: Normocephalic.   Cardiovascular:      Rate and Rhythm: Normal rate and regular rhythm.      Pulses: Normal pulses.      Heart sounds: Normal heart sounds.   Pulmonary:      Effort: Pulmonary effort is normal.      Breath sounds: Normal breath sounds.   Abdominal:      General: Abdomen is flat.      Palpations: Abdomen is soft.   Musculoskeletal:         General: Normal range of motion.      Cervical back: Normal range of motion.   Skin:     General: Skin is warm and dry.   Neurological:      Mental Status: He is alert.   Psychiatric:         Mood and Affect: Mood normal.         Results     Lab Results   Component Value Date    WBC 11.25 10/28/2021    RBC 5.61 10/28/2021    HGB 16.1 10/28/2021    HCT 48.7 10/28/2021    MCV 87 10/28/2021    MCH 28.7 10/28/2021    MCHC 33.1  10/28/2021    RDW 15.9 (H) 10/28/2021     10/28/2021    MPV 12.7 10/28/2021    GRAN 61.0 10/28/2021    LYMPH 27.0 10/28/2021    MONO 5.0 10/28/2021    EOSINOPHIL 4.0 10/28/2021    BASOPHIL 3.0 (H) 10/28/2021     Sodium   Date Value Ref Range Status   10/28/2021 140 136 - 145 mmol/L Final     Potassium   Date Value Ref Range Status   10/28/2021 4.1 3.5 - 5.1 mmol/L Final     Chloride   Date Value Ref Range Status   10/28/2021 106 95 - 110 mmol/L Final     CO2   Date Value Ref Range Status   10/28/2021 25 23 - 29 mmol/L Final     Glucose   Date Value Ref Range Status   10/28/2021 85 70 - 110 mg/dL Final     BUN   Date Value Ref Range Status   10/28/2021 15 6 - 20 mg/dL Final     Creatinine   Date Value Ref Range Status   10/28/2021 1.2 0.5 - 1.4 mg/dL Final     Calcium   Date Value Ref Range Status   10/28/2021 9.9 8.7 - 10.5 mg/dL Final     Total Protein   Date Value Ref Range Status   10/28/2021 7.8 6.0 - 8.4 g/dL Final     Albumin   Date Value Ref Range Status   10/28/2021 4.2 3.5 - 5.2 g/dL Final     Total Bilirubin   Date Value Ref Range Status   10/28/2021 0.5 0.1 - 1.0 mg/dL Final     Comment:     For infants and newborns, interpretation of results should be based  on gestational age, weight and in agreement with clinical  observations.    Premature Infant recommended reference ranges:  Up to 24 hours.............<8.0 mg/dL  Up to 48 hours............<12.0 mg/dL  3-5 days..................<15.0 mg/dL  6-29 days.................<15.0 mg/dL       Alkaline Phosphatase   Date Value Ref Range Status   10/28/2021 74 55 - 135 U/L Final     AST   Date Value Ref Range Status   10/28/2021 16 10 - 40 U/L Final     ALT   Date Value Ref Range Status   10/28/2021 18 10 - 44 U/L Final     Anion Gap   Date Value Ref Range Status   10/28/2021 9 8 - 16 mmol/L Final     eGFR if    Date Value Ref Range Status   10/28/2021 >60 >60 mL/min/1.73 m^2 Final     eGFR if non    Date Value Ref Range  Status   10/28/2021 >60 >60 mL/min/1.73 m^2 Final     Comment:     Calculation used to obtain the estimated glomerular filtration  rate (eGFR) is the CKD-EPI equation.        Lab Results   Component Value Date    CHOL 169 10/19/2021     Lab Results   Component Value Date    HDL 36 10/19/2021     No results found for: LDLCALC  Lab Results   Component Value Date    TRIG 95 10/19/2021     No results found for: CHOLHDL  Lab Results   Component Value Date    TSH 1.1453 10/19/2021     Lab Results   Component Value Date    PHUR 6.0 10/28/2021    SPECGRAV 1.025 10/28/2021    PROTEINUA Negative 10/28/2021    GLUCUA Negative 10/28/2021    KETONESU Negative 10/28/2021    OCCULTUA Negative 10/28/2021    NITRITE Negative 10/28/2021    LEUKOCYTESUR Negative 10/28/2021     Lab Results   Component Value Date    HGBA1C 5.6 10/19/2021    HGBA1C 5.3 03/24/2021    HGBA1C 5.5 05/28/2020     No results found for: MICROALBUR, [id number]   No results found for this or any previous visit.         Assessment and Plan (including Health Maintenance)     Plan:         Health Maintenance Due   Topic Date Due    Pneumococcal Vaccines (Age 0-64) (1 - PCV) Never done    Colorectal Cancer Screening  Never done    COVID-19 Vaccine (4 - Booster for Pfizer series) 12/13/2021    Shingles Vaccine (2 of 2) 10/07/2022       Problem List Items Addressed This Visit          Pulmonary    Chronic obstructive pulmonary disease    Current Assessment & Plan     RX Advair Diskus 250/50   RX albuterol inhaler prn   Use inhalers as prescribed (rinse mouth after use of steroid inhalers). Use long term inhalers daily and rescue inhaler as needed.  Avoid triggers (high humidity, strong odors, chemical fumes).  Report signs of upper respiratory infection immediately for early treatment.  Flu shot recommended yearly.  Practice abdominal breathing. Eat smaller, more frequent meals.  Smoking Cessation encouraged.           Relevant Medications    ADVAIR DISKUS 250-50  mcg/dose diskus inhaler    albuterol (PROVENTIL/VENTOLIN HFA) 90 mcg/actuation inhaler    cetirizine (ZYRTEC) 10 MG tablet    albuterol (PROVENTIL) 2.5 mg /3 mL (0.083 %) nebulizer solution    Asthma    Current Assessment & Plan     RX Advair Diskus 250/50   RX albuterol inhaler prn   Use inhalers as prescribed (rinse mouth after use of steroid inhalers). Use long term inhalers daily and rescue inhaler as needed.  Avoid triggers (high humidity, strong odors, chemical fumes).  Report signs of upper respiratory infection immediately for early treatment.  Flu shot recommended yearly.  Practice abdominal breathing. Eat smaller, more frequent meals.  Smoking Cessation encouraged.           Relevant Medications    ADVAIR DISKUS 250-50 mcg/dose diskus inhaler    albuterol (PROVENTIL/VENTOLIN HFA) 90 mcg/actuation inhaler    cetirizine (ZYRTEC) 10 MG tablet    albuterol (PROVENTIL) 2.5 mg /3 mL (0.083 %) nebulizer solution       Cardiac/Vascular    Primary hypertension    Current Assessment & Plan     Med refill next week   Low Sodium Diet (Dash Diet - less than 2 grams of sodium per day).  Maintain healthy weight with goal BMI <30. Exercise 30 minutes per day 5 days per week.           Relevant Orders    T4, Free    TSH    Lipid Panel    Comprehensive Metabolic Panel    CBC Auto Differential       Endocrine    Vitamin D deficiency    Current Assessment & Plan     Educated on increasing foods high in Vitamin D such as fish oil, cod liver oil, salmon, milk fortified with vitamin D.   Vitamin D 2000 I.U. tablets daily (purchase over the counter).  Repeat Vitamin D level as ordered.           Relevant Orders    Vitamin D       GI    Gastroesophageal reflux disease without esophagitis    Current Assessment & Plan     RX omeprazole 40 mg daily   Avoid spicy, acidic, fried foods and alcohol.  Eat 2-3 hours before going to bed.  Avoid tight clothing, chew food thoroughly.  Reduce caffeine intake, avoid soda.  Stressed importance  of Smoking/Tobacco Cessation.           Relevant Medications    omeprazole (PRILOSEC) 40 MG capsule       Orthopedic    Primary osteoarthritis of both knees    Current Assessment & Plan     RX Diclofenac 75 mg BID  Knee X-rays on Chart  Perform knee stretches daily  Exercise as tolerated (low impact)  Avoid activities that increase the pain or cause stiffness.  Apply heating pad, ice pack, OTC topical as needed.  Massage to loosen muscles.  Continues NSAID as needed  F/U if worsening            Relevant Medications    diclofenac (VOLTAREN) 75 MG EC tablet       Other    Cigarette nicotine dependence without complication    Current Assessment & Plan     Smoking cessation discussed > 3 mins  Pt not ready to quit.  Discussed benefits of quitting including improved health, decreased cardiac/vascular/pulmonary/stroke risks as well as saving money.            Other Visit Diagnoses       Wellness examination    -  Primary    Relevant Orders    Urinalysis, Reflex to Urine Culture    T4, Free    TSH    Lipid Panel    Comprehensive Metabolic Panel    CBC Auto Differential    Vitamin D    Immunization due        Relevant Orders    Influenza - Quadrivalent *Preferred* (6 months+) (PF) (Completed)    Colon cancer screening        Relevant Orders    Cologuard Screening (Multitarget Stool DNA)            Health Maintenance Topics with due status: Not Due       Topic Last Completion Date    TETANUS VACCINE 07/11/2018    Lipid Panel 10/19/2021       Future Appointments   Date Time Provider Department Center   11/3/2022  1:00 PM ABDOULAYE Snell [facility]            Signature:     [facility]  [address] [facility] [address]    Date of encounter: 10/26/22

## 2022-10-27 ENCOUNTER — TELEPHONE (OUTPATIENT)
Dept: INTERNAL MEDICINE | Facility: CLINIC | Age: 62
End: 2022-10-27
Payer: MEDICARE

## 2022-10-27 PROBLEM — J45.20 MILD INTERMITTENT ASTHMA WITHOUT COMPLICATION: Status: ACTIVE | Noted: 2022-10-26

## 2022-10-27 NOTE — TELEPHONE ENCOUNTER
Pt called stating that he does not have a asthma pump for the albuterol 90mcg/actuation inhaler. Pt asked if you could send a prescription for him to get one.

## 2022-10-27 NOTE — TELEPHONE ENCOUNTER
Clarification from patient that he needs a neb machine to use his RX albuterol with. RX for machine completed and will be faxed to [facility] in [place].    Thanks.

## 2022-10-31 NOTE — TELEPHONE ENCOUNTER
Patient called stating he contacted [facility] about nebulizer and they stated they did not receive script. I called and they stated they do not accept patient's insurance. Order will need to be sent somewhere else. Patient also requesting a call back. Please advise.

## 2022-10-31 NOTE — TELEPHONE ENCOUNTER
Staff can either contact a few DME supply places in the area for me to send new script or the patient can contact the back of his insurance card and they can assist as well.    Ama

## 2022-10-31 NOTE — TELEPHONE ENCOUNTER
Spoke with pt I did inform him that I would call around to some places but if he could contact the number on the back of his insurance card to help me find somewhere to send the Rx pt verbalized understanding and stated he would call back with some places.

## 2022-10-31 NOTE — TELEPHONE ENCOUNTER
Spoke with pt he stated he does not know anywhere else that would fill this order for him he stated it does not matter where we send it to just let him know where we send it he stated that he contacted [facility] to see if he could send it there and they told him they don't do that there.

## 2022-11-01 NOTE — TELEPHONE ENCOUNTER
Patient called back with place, phone and fax.  Carla. Requesting a call back when sent.  P: [phone]  F: [phone]

## 2022-11-01 NOTE — TELEPHONE ENCOUNTER
Spoke to Carla due to getting a failure fax confirmation I called to see if they had a different fax number they stated that they will not be able to fill this order unless you order the medication as well. They cannot give just the machine it has to be both machine and medication.  Pt was informed he asked what does he need to do now.       [facility]la fax number- [phone]

## 2022-11-07 ENCOUNTER — OFFICE VISIT (OUTPATIENT)
Dept: INTERNAL MEDICINE | Facility: CLINIC | Age: 62
End: 2022-11-07
Payer: MEDICARE

## 2022-11-07 DIAGNOSIS — I10 PRIMARY HYPERTENSION: Primary | ICD-10-CM

## 2022-11-07 DIAGNOSIS — J44.9 CHRONIC OBSTRUCTIVE PULMONARY DISEASE, UNSPECIFIED COPD TYPE: ICD-10-CM

## 2022-11-07 DIAGNOSIS — J45.20 MILD INTERMITTENT ASTHMA WITHOUT COMPLICATION: ICD-10-CM

## 2022-11-07 DIAGNOSIS — E55.9 VITAMIN D DEFICIENCY: ICD-10-CM

## 2022-11-07 DIAGNOSIS — Z12.5 PROSTATE CANCER SCREENING: ICD-10-CM

## 2022-11-07 DIAGNOSIS — F17.210 CIGARETTE NICOTINE DEPENDENCE WITHOUT COMPLICATION: ICD-10-CM

## 2022-11-07 DIAGNOSIS — Z00.00 WELLNESS EXAMINATION: ICD-10-CM

## 2022-11-07 PROCEDURE — 4010F ACE/ARB THERAPY RXD/TAKEN: CPT | Mod: CPTII,95,, | Performed by: NURSE PRACTITIONER

## 2022-11-07 PROCEDURE — 99406 BEHAV CHNG SMOKING 3-10 MIN: CPT | Mod: 95,25,, | Performed by: NURSE PRACTITIONER

## 2022-11-07 PROCEDURE — 99442 PR PHYSICIAN TELEPHONE EVALUATION 11-20 MIN: ICD-10-PCS | Mod: 95,,, | Performed by: NURSE PRACTITIONER

## 2022-11-07 PROCEDURE — 1159F PR MEDICATION LIST DOCUMENTED IN MEDICAL RECORD: ICD-10-PCS | Mod: CPTII,95,, | Performed by: NURSE PRACTITIONER

## 2022-11-07 PROCEDURE — 99442 PR PHYSICIAN TELEPHONE EVALUATION 11-20 MIN: CPT | Mod: 95,,, | Performed by: NURSE PRACTITIONER

## 2022-11-07 PROCEDURE — 1160F RVW MEDS BY RX/DR IN RCRD: CPT | Mod: CPTII,95,, | Performed by: NURSE PRACTITIONER

## 2022-11-07 PROCEDURE — 99406 PR TOBACCO USE CESSATION INTERMEDIATE 3-10 MINUTES: ICD-10-PCS | Mod: 95,25,, | Performed by: NURSE PRACTITIONER

## 2022-11-07 PROCEDURE — 1160F PR REVIEW ALL MEDS BY PRESCRIBER/CLIN PHARMACIST DOCUMENTED: ICD-10-PCS | Mod: CPTII,95,, | Performed by: NURSE PRACTITIONER

## 2022-11-07 PROCEDURE — 1159F MED LIST DOCD IN RCRD: CPT | Mod: CPTII,95,, | Performed by: NURSE PRACTITIONER

## 2022-11-07 PROCEDURE — 4010F PR ACE/ARB THEARPY RXD/TAKEN: ICD-10-PCS | Mod: CPTII,95,, | Performed by: NURSE PRACTITIONER

## 2022-11-07 RX ORDER — LISINOPRIL AND HYDROCHLOROTHIAZIDE 20; 25 MG/1; MG/1
1 TABLET ORAL DAILY
Qty: 90 TABLET | Refills: 3 | Status: SHIPPED | OUTPATIENT
Start: 2022-11-07 | End: 2023-12-19 | Stop reason: SINTOL

## 2022-11-07 RX ORDER — CETIRIZINE HYDROCHLORIDE 10 MG/1
10 TABLET ORAL DAILY
Qty: 90 TABLET | Refills: 3 | Status: SHIPPED | OUTPATIENT
Start: 2022-11-07 | End: 2023-12-19 | Stop reason: SDUPTHER

## 2022-11-07 RX ORDER — AMLODIPINE BESYLATE 5 MG/1
5 TABLET ORAL DAILY
Qty: 90 TABLET | Refills: 3 | Status: SHIPPED | OUTPATIENT
Start: 2022-11-07 | End: 2023-12-05 | Stop reason: SDUPTHER

## 2022-11-07 NOTE — PROGRESS NOTES
ABDOULAYE Snell   [facility]  [address] [facility] [address]      PATIENT NAME: Chacorta Sims  : 1960  DATE: 22  MRN: [id number]      Patient PCP Information       Provider PCP Type    ABDOULAYE Snell General            Reason for Visit / Chief Complaint: Follow-up (Lab review / nebulizer machine)       History of Present Illness / Problem Focused Workflow     Chacorta Sims presents to the clinic with Follow-up (Lab review / nebulizer machine)     63 yo AAM here for f/u routine Wellness f/u. PMHx includes prostate cancer (dx 2019), Followed by Dr. Rudolph at 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 30 pack/year smoker.  Drinks 12 pack/beer every few days.     Prostate Cancer Screening - 22 PSA <0.1 Follow up annually.  Colon Cancer Screening - 10/26/22 Cologuard Ordered  Osteoporosis Screening - 10/26/22 Vit D 31.8    10/26/22  Pt here today for routine wellness visit, no labs completed for today's visit, will complete after the visit and f/u next week to discuss results. Pt agreeable to Flu shot today and Cologuard order. Meds reviewed and refilled appropriately. Pt reports with trouble sleeping, she reports that he has tried Seroquel and he reports that it was too strong, does not want any pills, has tried OTC, pt reports that used THC recently and that helped, number to Teleleaf given to patient to contact. Will refill BP med next week once labs reviewed. Denies any other issues today.     22  Pt here today for routine f/u for lab review from recent wellness visit. Labs reviewed with no questions or concerns at this time, meds reviewed and refilled appropriately. Is agreeable 1 year f/u for routine wellness and prn. All  screenings UTD/ Ordered.   Denies chest pain, shortness of breath, cough, fever, headache, dizziness, weakness, abdominal pain, nausea, vomiting, diarrhea, constipation, black/bloody stools, unplanned weight loss, night sweats, changes in urinary patterns, burning/odor with urination, depression, anxiety, and SI/HI.       Follow-up      Review of Systems     Review of Systems   Constitutional: Negative.    HENT: Negative.     Eyes: Negative.    Respiratory: Negative.     Cardiovascular: Negative.    Gastrointestinal: Negative.    Endocrine: Negative.    Genitourinary: Negative.    Neurological: Negative.    Psychiatric/Behavioral: Negative.           Medications and Allergies     Medications  Medication List with Changes/Refills   Current Medications    ADVAIR DISKUS 250-50 MCG/DOSE DISKUS INHALER    Inhale 1 puff into the lungs 2 (two) times a day. Maintenance Inhaler    ALBUTEROL (PROVENTIL) 2.5 MG /3 ML (0.083 %) NEBULIZER SOLUTION    Take 3 mLs (2.5 mg total) by nebulization every 4 to 6 hours as needed for Wheezing or Shortness of Breath. Rescue    ALBUTEROL (PROVENTIL/VENTOLIN HFA) 90 MCG/ACTUATION INHALER    Inhale 1-2 puffs into the lungs every 4 to 6 hours as needed for Wheezing or Shortness of Breath. Rescue Inhaler    CALCIUM CARBONATE (OS-BRENT) 600 MG CALCIUM (1,500 MG) TAB    Take 600 mg by mouth.    DICLOFENAC (VOLTAREN) 75 MG EC TABLET    Take 1 tablet (75 mg total) by mouth 2 (two) times daily. As needed for knee pain    OMEPRAZOLE (PRILOSEC) 40 MG CAPSULE    Take 1 capsule (40 mg total) by mouth once daily. For Acid Reflux    SILDENAFIL (VIAGRA) 100 MG TABLET    Take 100 mg by mouth.   Changed and/or Refilled Medications    Modified Medication Previous Medication    AMLODIPINE (NORVASC) 5 MG TABLET amLODIPine (NORVASC) 5 MG tablet       Take 1 tablet (5 mg total) by mouth once daily. For High Blood Pressure    Take 5 mg by mouth once daily. For High Blood Pressure    CETIRIZINE (ZYRTEC) 10 MG TABLET  cetirizine (ZYRTEC) 10 MG tablet       Take 1 tablet (10 mg total) by mouth once daily. For allergies / asthma    Take 1 tablet (10 mg total) by mouth once daily. For allergies / asthma    LISINOPRIL-HYDROCHLOROTHIAZIDE (PRINZIDE,ZESTORETIC) 20-25 MG TAB lisinopriL-hydrochlorothiazide (PRINZIDE,ZESTORETIC) 20-25 mg Tab       Take 1 tablet by mouth once daily. For High Blood Pressure    Take 1 tablet by mouth once daily.   Discontinued Medications    CALCIUM CARBONATE-VITAMIN D3 250-125  MG-3.125 MCG (125 UNIT) TAB    Take 1 tablet by mouth once daily.        Allergies  Review of patient's allergies indicates:  No Known Allergies    Physical Examination   There were no vitals filed for this visit.  Physical Exam  HENT:      Right Ear: Hearing normal.      Left Ear: Hearing normal.   Neurological:      Mental Status: He is alert and oriented to person, place, and time.   Psychiatric:         Mood and Affect: Mood normal.         Results     Lab Results   Component Value Date    WBC 12.8 (H) 10/26/2022    RBC 5.42 10/26/2022    HGB 15.2 10/26/2022    HCT 46.6 10/26/2022    MCV 86.0 10/26/2022    MCH 28.0 10/26/2022    MCHC 32.6 (L) 10/26/2022    RDW 16.4 10/26/2022     10/26/2022    MPV  10/26/2022      Comment:      N/A    GRAN 61.0 10/28/2021    LYMPH 27.0 10/28/2021    MONO 5.0 10/28/2021    EOSINOPHIL 4.0 10/28/2021    BASOPHIL 3.0 (H) 10/28/2021     CMP  Sodium   Date Value Ref Range Status   10/28/2021 140 136 - 145 mmol/L Final     Sodium Level   Date Value Ref Range Status   10/26/2022 140 136 - 145 mmol/L Final     Potassium   Date Value Ref Range Status   10/28/2021 4.1 3.5 - 5.1 mmol/L Final     Potassium Level   Date Value Ref Range Status   10/26/2022 3.9 3.5 - 5.1 mmol/L Final     Chloride   Date Value Ref Range Status   10/28/2021 106 95 - 110 mmol/L Final     CO2   Date Value Ref Range Status   10/28/2021 25 23 - 29 mmol/L Final     Carbon Dioxide   Date Value Ref Range Status   10/26/2022  24 23 - 31 mmol/L Final     Glucose   Date Value Ref Range Status   10/28/2021 85 70 - 110 mg/dL Final     BUN   Date Value Ref Range Status   10/28/2021 15 6 - 20 mg/dL Final     Blood Urea Nitrogen   Date Value Ref Range Status   10/26/2022 10.2 8.4 - 25.7 mg/dL Final     Creatinine   Date Value Ref Range Status   10/26/2022 1.02 0.73 - 1.18 mg/dL Final   10/28/2021 1.2 0.5 - 1.4 mg/dL Final     Calcium   Date Value Ref Range Status   10/28/2021 9.9 8.7 - 10.5 mg/dL Final     Calcium Level Total   Date Value Ref Range Status   10/26/2022 10.0 8.8 - 10.0 mg/dL Final     Total Protein   Date Value Ref Range Status   10/28/2021 7.8 6.0 - 8.4 g/dL Final     Albumin   Date Value Ref Range Status   10/28/2021 4.2 3.5 - 5.2 g/dL Final     Albumin Level   Date Value Ref Range Status   10/26/2022 4.0 3.4 - 4.8 gm/dL Final     Total Bilirubin   Date Value Ref Range Status   10/28/2021 0.5 0.1 - 1.0 mg/dL Final     Comment:     For infants and newborns, interpretation of results should be based  on gestational age, weight and in agreement with clinical  observations.    Premature Infant recommended reference ranges:  Up to 24 hours.............<8.0 mg/dL  Up to 48 hours............<12.0 mg/dL  3-5 days..................<15.0 mg/dL  6-29 days.................<15.0 mg/dL       Bilirubin Total   Date Value Ref Range Status   10/26/2022 0.5 <=1.5 mg/dL Final     Alkaline Phosphatase   Date Value Ref Range Status   10/26/2022 64 40 - 150 unit/L Final   10/28/2021 74 55 - 135 U/L Final     AST   Date Value Ref Range Status   10/28/2021 16 10 - 40 U/L Final     Aspartate Aminotransferase   Date Value Ref Range Status   10/26/2022 16 5 - 34 unit/L Final     ALT   Date Value Ref Range Status   10/28/2021 18 10 - 44 U/L Final     Alanine Aminotransferase   Date Value Ref Range Status   10/26/2022 15 0 - 55 unit/L Final     Anion Gap   Date Value Ref Range Status   10/28/2021 9 8 - 16 mmol/L Final     eGFR if    Date  Value Ref Range Status   10/28/2021 >60 >60 mL/min/1.73 m^2 Final     eGFR if non    Date Value Ref Range Status   10/28/2021 >60 >60 mL/min/1.73 m^2 Final     Comment:     Calculation used to obtain the estimated glomerular filtration  rate (eGFR) is the CKD-EPI equation.        Lab Results   Component Value Date    CHOL 166 10/26/2022     Lab Results   Component Value Date    HDL 39 10/26/2022     No results found for: LDLCALC  Lab Results   Component Value Date    TRIG 145 (H) 10/26/2022     No results found for: CHOLHDL  Lab Results   Component Value Date    TSH 1.3517 10/26/2022     Lab Results   Component Value Date    PHUR 6.0 10/28/2021    SPECGRAV 1.025 10/28/2021    PROTEINUA Negative 10/26/2022    GLUCUA Negative 10/28/2021    KETONESU Negative 10/28/2021    OCCULTUA Negative 10/28/2021    NITRITE Negative 10/28/2021    LEUKOCYTESUR Negative 10/26/2022           Assessment and Plan (including Health Maintenance)     Plan:       Problem List Items Addressed This Visit          Pulmonary    Chronic obstructive pulmonary disease    Relevant Medications    cetirizine (ZYRTEC) 10 MG tablet    Mild intermittent asthma without complication    Relevant Medications    cetirizine (ZYRTEC) 10 MG tablet       Cardiac/Vascular    Primary hypertension - Primary    Current Assessment & Plan     RX amlodipine 5 mg daily  RX lisinopril / hctz 20/25 mg daily  Low Sodium Diet (Dash Diet - less than 2 grams of sodium per day).  Maintain healthy weight with goal BMI <30. Exercise 30 minutes per day 5 days per week.           Relevant Medications    lisinopriL-hydrochlorothiazide (PRINZIDE,ZESTORETIC) 20-25 mg Tab    amLODIPine (NORVASC) 5 MG tablet    Other Relevant Orders    CBC Auto Differential    Comprehensive Metabolic Panel    Lipid Panel    TSH    Urinalysis, Reflex to Urine Culture Urine, Clean Catch       Endocrine    Vitamin D deficiency    Relevant Orders    Vitamin D       Other    Cigarette  nicotine dependence without complication    Current Assessment & Plan     Smoking cessation discussed > 3 mins.  Pt not ready to quit.  Discussed benefits of quitting including improved health, decreased cardiac/vascular/pulmonary/stroke risks as well as saving money.            Other Visit Diagnoses       Wellness examination        Relevant Orders    CBC Auto Differential    Comprehensive Metabolic Panel    Lipid Panel    TSH    Vitamin D    Urinalysis, Reflex to Urine Culture Urine, Clean Catch    Prostate cancer screening        Relevant Orders    PSA, Screening              Future Appointments   Date Time Provider Department Center   11/7/2022  1:15 PM ABDOULAYE Snell [facility]              Signature:     [facility]  [address] [facility] [address]    Date of encounter: 11/7/22      Established Patient - Audio Only Telehealth Visit     The patient location is: home  The chief complaint leading to consultation is: lab review  Visit type: Virtual visit with audio only (telephone)  Total time spent with patient: 13 mins       The reason for the audio only service rather than synchronous audio and video virtual visit was related to technical difficulties or patient preference/necessity.     Each patient to whom I provide medical services by telemedicine is:  (1) informed of the relationship between the physician and patient and the respective role of any other health care provider with respect to management of the patient; and (2) notified that they may decline to receive medical services by telemedicine and may withdraw from such care at any time. Patient verbally consented to receive this service via voice-only telephone call.       This service was not originating from a related E/M service provided within the previous 7 days nor will  to an E/M service or procedure within the next 24 hours or my soonest available  appointment.  Prevailing standard of care was able to be met in this audio-only visit.

## 2022-11-07 NOTE — ASSESSMENT & PLAN NOTE
Smoking cessation discussed > 3 mins.  Pt not ready to quit.  Discussed benefits of quitting including improved health, decreased cardiac/vascular/pulmonary/stroke risks as well as saving money.

## 2022-11-07 NOTE — ASSESSMENT & PLAN NOTE
RX amlodipine 5 mg daily  RX lisinopril / hctz 20/25 mg daily  Low Sodium Diet (Dash Diet - less than 2 grams of sodium per day).  Maintain healthy weight with goal BMI <30. Exercise 30 minutes per day 5 days per week.

## 2022-11-09 ENCOUNTER — TELEPHONE (OUTPATIENT)
Dept: INTERNAL MEDICINE | Facility: CLINIC | Age: 62
End: 2022-11-09

## 2022-11-09 NOTE — TELEPHONE ENCOUNTER
Sofya from [facility] called on behalf of the patient and stated that he needs a nebulizer. Sofya also stated that she will fax a form as well she just wanted to call and let us know first

## 2022-11-10 NOTE — TELEPHONE ENCOUNTER
Katya with [employer] called requesting a call back from the nurse in regards to pt being SOB. States she would like to offer an overnight oximetry. Please call Katya to advise.

## 2022-11-13 LAB — NONINV COLON CA DNA+OCC BLD SCRN STL QL: NEGATIVE

## 2023-04-08 ENCOUNTER — HOSPITAL ENCOUNTER (EMERGENCY)
Facility: HOSPITAL | Age: 63
Discharge: HOME | End: 2023-04-08
Attending: EMERGENCY MEDICINE
Payer: MEDICARE

## 2023-04-08 VITALS
BODY MASS INDEX: 26.19 KG/M2 | TEMPERATURE: 98 F | HEART RATE: 74 BPM | DIASTOLIC BLOOD PRESSURE: 91 MMHG | SYSTOLIC BLOOD PRESSURE: 148 MMHG | WEIGHT: 172.81 LBS | HEIGHT: 68 IN | RESPIRATION RATE: 16 BRPM | OXYGEN SATURATION: 98 %

## 2023-04-08 DIAGNOSIS — M54.12 RIGHT CERVICAL RADICULOPATHY: ICD-10-CM

## 2023-04-08 DIAGNOSIS — T78.3XXA ANGIOEDEMA, INITIAL ENCOUNTER: Primary | ICD-10-CM

## 2023-04-08 PROCEDURE — 25000003 PHARM REV CODE 250: Performed by: PHYSICIAN ASSISTANT

## 2023-04-08 PROCEDURE — 63600175 PHARM REV CODE 636 W HCPCS: Performed by: PHYSICIAN ASSISTANT

## 2023-04-08 PROCEDURE — 99284 EMERGENCY DEPT VISIT MOD MDM: CPT

## 2023-04-08 PROCEDURE — 96372 THER/PROPH/DIAG INJ SC/IM: CPT | Performed by: PHYSICIAN ASSISTANT

## 2023-04-08 RX ORDER — HYDROCHLOROTHIAZIDE 12.5 MG/1
50 TABLET ORAL DAILY
Qty: 120 TABLET | Refills: 0 | Status: SHIPPED | OUTPATIENT
Start: 2023-04-08 | End: 2023-12-19 | Stop reason: SDUPTHER

## 2023-04-08 RX ORDER — DIPHENHYDRAMINE HYDROCHLORIDE 50 MG/ML
25 INJECTION INTRAMUSCULAR; INTRAVENOUS
Status: COMPLETED | OUTPATIENT
Start: 2023-04-08 | End: 2023-04-08

## 2023-04-08 RX ORDER — DEXAMETHASONE SODIUM PHOSPHATE 4 MG/ML
8 INJECTION, SOLUTION INTRA-ARTICULAR; INTRALESIONAL; INTRAMUSCULAR; INTRAVENOUS; SOFT TISSUE
Status: COMPLETED | OUTPATIENT
Start: 2023-04-08 | End: 2023-04-08

## 2023-04-08 RX ORDER — FAMOTIDINE 20 MG/1
20 TABLET, FILM COATED ORAL
Status: COMPLETED | OUTPATIENT
Start: 2023-04-08 | End: 2023-04-08

## 2023-04-08 RX ADMIN — DEXAMETHASONE SODIUM PHOSPHATE 8 MG: 4 INJECTION, SOLUTION INTRA-ARTICULAR; INTRALESIONAL; INTRAMUSCULAR; INTRAVENOUS; SOFT TISSUE at 08:04

## 2023-04-08 RX ADMIN — FAMOTIDINE 20 MG: 20 TABLET, FILM COATED ORAL at 08:04

## 2023-04-08 RX ADMIN — DIPHENHYDRAMINE HYDROCHLORIDE 25 MG: 50 INJECTION INTRAMUSCULAR; INTRAVENOUS at 08:04

## 2023-04-09 NOTE — DISCHARGE INSTRUCTIONS
Stop taking Lisinopril with Hydrochlorothiazide and begin taking just Hydrochlorothiazide daily.  Prescription sent to pharmacy.  Return immediately if symptoms worsened.  He just feels like lightheaded and staff pressure because focused.  Follow-up with your primary care provider within 2-3 days.  Check blood pressure daily and record reading.

## 2023-04-09 NOTE — ED PROVIDER NOTES
Encounter Date: 4/8/2023       History     Chief Complaint   Patient presents with    Facial Swelling     Woke up this am with swelling to the lower lip.  No SOB at this time.      Arm Pain     Rt arm pain for a few days, no hx of trauma      62 yo M w/ PMHx significant for HTN, COPD & smoking presents to ED c/o lower lip swelling since this AM. Patient reports lip gradually increased in size throughout day, but has stabilized over last few hours. Reports this has happened before w/o any significant complication; denies hx of anaphylaxis. Prescribed lisinopril-HCTZ & reports taking every day. Denies any known food or medical allergies. Denies recent insect sting. Denies dysphagia, stridor, drooling, CP, SOB, wheezing, palpitations, syncope, rash, HA, lightheadedness, F/C. Patient also c/o pain in R shoulder which radiates down RUE. Reports associated paresthesia in fingertips of R hand. Reports known hx of pinched cervical nerve. Denies any recent falls or other injury. Denies paralysis, pallor, poikilothermia, diffuse numbness, focal weakness. VSS on arrival, patient in NAD.    The history is provided by the patient. No  was used.   Review of patient's allergies indicates:  No Known Allergies  Past Medical History:   Diagnosis Date    Arthritis     Asthma     Cancer     GERD (gastroesophageal reflux disease)     Hypertension      Past Surgical History:   Procedure Laterality Date    PROSTATECTOMY      removal of muscles in forehead       Family History   Problem Relation Age of Onset    Cancer Mother     Cancer Father      Social History     Tobacco Use    Smoking status: Heavy Smoker     Packs/day: 0.50     Types: Cigarettes    Smokeless tobacco: Never   Substance Use Topics    Alcohol use: Yes     Alcohol/week: 2.0 standard drinks     Types: 2 Cans of beer per week     Comment: weekend    Drug use: Never     Review of Systems   All other systems reviewed and are negative.    Physical Exam      Initial Vitals [04/08/23 1842]   BP Pulse Resp Temp SpO2   (!) 142/91 75 18 98.1 °F (36.7 °C) 96 %      MAP       --         Physical Exam    Nursing note and vitals reviewed.  Constitutional: He appears well-developed and well-nourished. He is not diaphoretic. No distress.   HENT:   Head: Normocephalic and atraumatic.       Mouth/Throat: Uvula is midline, oropharynx is clear and moist and mucous membranes are normal. No uvula swelling. No oropharyngeal exudate, posterior oropharyngeal edema or posterior oropharyngeal erythema.   Eyes: Conjunctivae are normal. Pupils are equal, round, and reactive to light.   Neck: Neck supple.   Positive spurling test on R side   Normal range of motion.  Cardiovascular:  Normal rate, regular rhythm, normal heart sounds and intact distal pulses.     Exam reveals no gallop and no friction rub.       No murmur heard.  Pulmonary/Chest: Breath sounds normal. No stridor. No respiratory distress. He has no wheezes. He has no rhonchi. He has no rales.   Abdominal: Abdomen is soft. Bowel sounds are normal. He exhibits no distension. There is no abdominal tenderness. There is no rebound and no guarding.   Musculoskeletal:         General: No tenderness or edema. Normal range of motion.      Cervical back: Normal range of motion and neck supple. No rigidity. Muscular tenderness (R paraspinal muscles) present. No spinous process tenderness. Normal range of motion.     Neurological: He is alert and oriented to person, place, and time. No cranial nerve deficit or sensory deficit.   Skin: Skin is warm and dry. Capillary refill takes less than 2 seconds. No rash noted. No erythema. No pallor.   Psychiatric: He has a normal mood and affect.       ED Course   Procedures  Labs Reviewed - No data to display       Imaging Results    None          Medications   dexAMETHasone injection 8 mg (8 mg Intramuscular Given 4/8/23 2053)   diphenhydrAMINE injection 25 mg (25 mg Intramuscular Given 4/8/23  2053)   famotidine tablet 20 mg (20 mg Oral Given 4/8/23 2050)     Medical Decision Making:   Initial Assessment:   62 yo M w/ PMHx significant for HTN, COPD & smoking presents to ED c/o lower lip swelling since this AM.  Differential Diagnosis:   Angioedema, allergic reaction  ED Management:  Medications given:  Dexamethasone 8 mg IM, Benadryl 25 mg IM, famotidine 20 mg PO    After 3.5 hours in the emergency department symptoms have not worsened.    Advised patient to stop taking lisinopril - hydrochlorothiazide sent a prescription for hydrochlorothiazide (50mg daily) to pharmacy.  He will follow up with the primary care provider.  The patient is resting comfortably and in no acute distress.  He states that his symptoms have improved after treatment in Emergency Department. I personally discussed his test results and treatment plan.  Gave strict ED precautions and specific conditions for return to the emergency department and importance of follow up with pcp.  Patient voices understanding and agrees to the plan discussed. All patients' questions have been answered at this time. He has remained hemodynamically stable throughout entire stay in ED and is stable for discharge home.             ED Course as of 04/08/23 2205   Sat Apr 08, 2023 2115 Patient care transitioned to MARINA Watson-SARAH [NB]      ED Course User Index  [NB] MARINA Isaacs                 Clinical Impression:   Final diagnoses:  [T78.3XXA] Angioedema, initial encounter (Primary)  [M54.12] Right cervical radiculopathy        ED Disposition Condition    Discharge Stable          ED Prescriptions       Medication Sig Dispense Start Date End Date Auth. Provider    hydroCHLOROthiazide (HYDRODIURIL) 12.5 MG Tab Take 4 tablets (50 mg total) by mouth once daily. 120 tablet 4/8/2023 5/8/2023 MARINA Post          Follow-up Information       Follow up With Specialties Details Why Contact Info    [facility] Emergency  Medicine  As needed, If symptoms worsen [address] [facility] [id number] [phone]             MARINA Post  04/08/23 1486

## 2023-11-06 ENCOUNTER — LAB VISIT (OUTPATIENT)
Dept: LAB | Facility: HOSPITAL | Age: 63
End: 2023-11-06
Attending: NURSE PRACTITIONER
Payer: MEDICARE

## 2023-11-06 DIAGNOSIS — E55.9 VITAMIN D DEFICIENCY: ICD-10-CM

## 2023-11-06 DIAGNOSIS — Z00.00 WELLNESS EXAMINATION: ICD-10-CM

## 2023-11-06 DIAGNOSIS — Z12.5 PROSTATE CANCER SCREENING: ICD-10-CM

## 2023-11-06 DIAGNOSIS — I10 PRIMARY HYPERTENSION: ICD-10-CM

## 2023-11-06 LAB
ADDITIONAL FINDINGS (OHS): NORMAL
ALBUMIN SERPL-MCNC: 4.1 G/DL (ref 3.4–4.8)
ALBUMIN/GLOB SERPL: 1.3 RATIO (ref 1.1–2)
ALP SERPL-CCNC: 70 UNIT/L (ref 40–150)
ALT SERPL-CCNC: 16 UNIT/L (ref 0–55)
AST SERPL-CCNC: 19 UNIT/L (ref 5–34)
BASOPHILS # BLD AUTO: 0.1 X10(3)/MCL
BASOPHILS NFR BLD AUTO: 0.7 %
BILIRUB SERPL-MCNC: 0.5 MG/DL
BUN SERPL-MCNC: 11.4 MG/DL (ref 8.4–25.7)
CALCIUM SERPL-MCNC: 9.2 MG/DL (ref 8.8–10)
CHLORIDE SERPL-SCNC: 105 MMOL/L (ref 98–107)
CHOLEST SERPL-MCNC: 154 MG/DL
CHOLEST/HDLC SERPL: 4 {RATIO} (ref 0–5)
CO2 SERPL-SCNC: 26 MMOL/L (ref 23–31)
CREAT SERPL-MCNC: 0.95 MG/DL (ref 0.73–1.18)
DEPRECATED CALCIDIOL+CALCIFEROL SERPL-MC: 35.3 NG/ML (ref 30–80)
EOSINOPHIL # BLD AUTO: 0.22 X10(3)/MCL (ref 0–0.9)
EOSINOPHIL NFR BLD AUTO: 1.5 %
ERYTHROCYTE [DISTWIDTH] IN BLOOD BY AUTOMATED COUNT: 15.9 % (ref 11.5–17)
GFR SERPLBLD CREATININE-BSD FMLA CKD-EPI: >60 MLS/MIN/1.73/M2
GLOBULIN SER-MCNC: 3.2 GM/DL (ref 2.4–3.5)
GLUCOSE SERPL-MCNC: 88 MG/DL (ref 82–115)
HCT VFR BLD AUTO: 45.9 % (ref 42–52)
HDLC SERPL-MCNC: 43 MG/DL (ref 35–60)
HGB BLD-MCNC: 15 G/DL (ref 14–18)
IMM GRANULOCYTES # BLD AUTO: 0.03 X10(3)/MCL (ref 0–0.04)
IMM GRANULOCYTES NFR BLD AUTO: 0.2 %
LDLC SERPL CALC-MCNC: 89 MG/DL (ref 50–140)
LYMPHOCYTES # BLD AUTO: 4.55 X10(3)/MCL (ref 0.6–4.6)
LYMPHOCYTES NFR BLD AUTO: 31.2 %
MCH RBC QN AUTO: 28.3 PG (ref 27–31)
MCHC RBC AUTO-ENTMCNC: 32.7 G/DL (ref 33–36)
MCV RBC AUTO: 86.6 FL (ref 80–94)
MONOCYTES # BLD AUTO: 0.82 X10(3)/MCL (ref 0.1–1.3)
MONOCYTES NFR BLD AUTO: 5.6 %
NEUTROPHILS # BLD AUTO: 8.86 X10(3)/MCL (ref 2.1–9.2)
NEUTROPHILS NFR BLD AUTO: 60.8 %
NRBC BLD AUTO-RTO: 0 %
PLATELET # BLD AUTO: 162 X10(3)/MCL (ref 130–400)
PLATELET # BLD EST: ADEQUATE 10*3/UL
PMV BLD AUTO: 12.3 FL (ref 7.4–10.4)
POTASSIUM SERPL-SCNC: 3.9 MMOL/L (ref 3.5–5.1)
PROT SERPL-MCNC: 7.3 GM/DL (ref 5.8–7.6)
PSA SERPL-MCNC: <0.1 NG/ML
RBC # BLD AUTO: 5.3 X10(6)/MCL (ref 4.7–6.1)
RBC MORPH BLD: NORMAL
SODIUM SERPL-SCNC: 139 MMOL/L (ref 136–145)
TRIGL SERPL-MCNC: 109 MG/DL (ref 34–140)
TSH SERPL-ACNC: 1.74 UIU/ML (ref 0.35–4.94)
VLDLC SERPL CALC-MCNC: 22 MG/DL
WBC # SPEC AUTO: 14.58 X10(3)/MCL (ref 4.5–11.5)

## 2023-11-06 PROCEDURE — 80061 LIPID PANEL: CPT

## 2023-11-06 PROCEDURE — 80053 COMPREHEN METABOLIC PANEL: CPT

## 2023-11-06 PROCEDURE — 84443 ASSAY THYROID STIM HORMONE: CPT

## 2023-11-06 PROCEDURE — 36415 COLL VENOUS BLD VENIPUNCTURE: CPT

## 2023-11-06 PROCEDURE — 85025 COMPLETE CBC W/AUTO DIFF WBC: CPT

## 2023-11-06 PROCEDURE — 82306 VITAMIN D 25 HYDROXY: CPT

## 2023-11-06 PROCEDURE — 84153 ASSAY OF PSA TOTAL: CPT

## 2023-11-15 ENCOUNTER — HOSPITAL ENCOUNTER (EMERGENCY)
Facility: HOSPITAL | Age: 63
Discharge: HOME | End: 2023-11-15
Attending: EMERGENCY MEDICINE
Payer: MEDICARE

## 2023-11-15 VITALS
SYSTOLIC BLOOD PRESSURE: 174 MMHG | OXYGEN SATURATION: 99 % | BODY MASS INDEX: 26.07 KG/M2 | DIASTOLIC BLOOD PRESSURE: 104 MMHG | HEART RATE: 61 BPM | WEIGHT: 172 LBS | HEIGHT: 68 IN | RESPIRATION RATE: 16 BRPM | TEMPERATURE: 98 F

## 2023-11-15 DIAGNOSIS — R07.89 CHEST WALL PAIN: Primary | ICD-10-CM

## 2023-11-15 LAB
ALBUMIN SERPL-MCNC: 3.5 G/DL (ref 3.4–4.8)
ALBUMIN/GLOB SERPL: 1.1 RATIO (ref 1.1–2)
ALP SERPL-CCNC: 71 UNIT/L (ref 40–150)
ALT SERPL-CCNC: 13 UNIT/L (ref 0–55)
AST SERPL-CCNC: 13 UNIT/L (ref 5–34)
BASOPHILS # BLD AUTO: 0.07 X10(3)/MCL
BASOPHILS NFR BLD AUTO: 0.7 %
BILIRUB SERPL-MCNC: 0.2 MG/DL
BNP BLD-MCNC: 22.2 PG/ML
BUN SERPL-MCNC: 15.8 MG/DL (ref 8.4–25.7)
CALCIUM SERPL-MCNC: 8.8 MG/DL (ref 8.8–10)
CHLORIDE SERPL-SCNC: 111 MMOL/L (ref 98–107)
CO2 SERPL-SCNC: 22 MMOL/L (ref 23–31)
CREAT SERPL-MCNC: 1 MG/DL (ref 0.73–1.18)
EOSINOPHIL # BLD AUTO: 0.25 X10(3)/MCL (ref 0–0.9)
EOSINOPHIL NFR BLD AUTO: 2.4 %
ERYTHROCYTE [DISTWIDTH] IN BLOOD BY AUTOMATED COUNT: 15.7 % (ref 11.5–17)
FLUAV AG UPPER RESP QL IA.RAPID: NOT DETECTED
FLUBV AG UPPER RESP QL IA.RAPID: NOT DETECTED
GFR SERPLBLD CREATININE-BSD FMLA CKD-EPI: >60 MLS/MIN/1.73/M2
GLOBULIN SER-MCNC: 3.1 GM/DL (ref 2.4–3.5)
GLUCOSE SERPL-MCNC: 84 MG/DL (ref 82–115)
HCT VFR BLD AUTO: 45.6 % (ref 42–52)
HGB BLD-MCNC: 14.7 G/DL (ref 14–18)
HOLD SPECIMEN: NORMAL
IMM GRANULOCYTES # BLD AUTO: 0.03 X10(3)/MCL (ref 0–0.04)
IMM GRANULOCYTES NFR BLD AUTO: 0.3 %
LYMPHOCYTES # BLD AUTO: 3.78 X10(3)/MCL (ref 0.6–4.6)
LYMPHOCYTES NFR BLD AUTO: 36.6 %
MAGNESIUM SERPL-MCNC: 1.8 MG/DL (ref 1.6–2.6)
MCH RBC QN AUTO: 28.2 PG (ref 27–31)
MCHC RBC AUTO-ENTMCNC: 32.2 G/DL (ref 33–36)
MCV RBC AUTO: 87.4 FL (ref 80–94)
MONOCYTES # BLD AUTO: 0.75 X10(3)/MCL (ref 0.1–1.3)
MONOCYTES NFR BLD AUTO: 7.3 %
NEUTROPHILS # BLD AUTO: 5.46 X10(3)/MCL (ref 2.1–9.2)
NEUTROPHILS NFR BLD AUTO: 52.7 %
NRBC BLD AUTO-RTO: 0 %
PLATELET # BLD AUTO: 148 X10(3)/MCL (ref 130–400)
PLATELETS.RETICULATED NFR BLD AUTO: 17.6 % (ref 0.9–11.2)
PMV BLD AUTO: 12.7 FL (ref 7.4–10.4)
POTASSIUM SERPL-SCNC: 4 MMOL/L (ref 3.5–5.1)
PROT SERPL-MCNC: 6.6 GM/DL (ref 5.8–7.6)
RBC # BLD AUTO: 5.22 X10(6)/MCL (ref 4.7–6.1)
SARS-COV-2 RNA RESP QL NAA+PROBE: NOT DETECTED
SODIUM SERPL-SCNC: 142 MMOL/L (ref 136–145)
TROPONIN I SERPL-MCNC: <0.01 NG/ML (ref 0–0.04)
WBC # SPEC AUTO: 10.34 X10(3)/MCL (ref 4.5–11.5)

## 2023-11-15 PROCEDURE — 83880 ASSAY OF NATRIURETIC PEPTIDE: CPT | Performed by: PHYSICIAN ASSISTANT

## 2023-11-15 PROCEDURE — 93005 ELECTROCARDIOGRAM TRACING: CPT

## 2023-11-15 PROCEDURE — 83735 ASSAY OF MAGNESIUM: CPT | Performed by: PHYSICIAN ASSISTANT

## 2023-11-15 PROCEDURE — 0240U COVID/FLU A&B PCR: CPT | Performed by: PHYSICIAN ASSISTANT

## 2023-11-15 PROCEDURE — 85025 COMPLETE CBC W/AUTO DIFF WBC: CPT | Performed by: PHYSICIAN ASSISTANT

## 2023-11-15 PROCEDURE — 80053 COMPREHEN METABOLIC PANEL: CPT | Performed by: PHYSICIAN ASSISTANT

## 2023-11-15 PROCEDURE — 99285 EMERGENCY DEPT VISIT HI MDM: CPT | Mod: 25

## 2023-11-15 PROCEDURE — 84484 ASSAY OF TROPONIN QUANT: CPT | Performed by: PHYSICIAN ASSISTANT

## 2023-11-15 RX ORDER — TIZANIDINE 4 MG/1
8 TABLET ORAL EVERY 6 HOURS PRN
Qty: 30 TABLET | Refills: 0 | Status: SHIPPED | OUTPATIENT
Start: 2023-11-15 | End: 2023-11-25

## 2023-11-15 RX ORDER — DICLOFENAC SODIUM 75 MG/1
75 TABLET, DELAYED RELEASE ORAL 2 TIMES DAILY PRN
Qty: 20 TABLET | Refills: 0 | Status: SHIPPED | OUTPATIENT
Start: 2023-11-15 | End: 2023-12-19 | Stop reason: SDUPTHER

## 2023-11-15 NOTE — ED PROVIDER NOTES
Encounter Date: 11/15/2023       History     Chief Complaint   Patient presents with    Chest Pain     Left side chest pain onset Monday, denies SOB or n/v denies cardiac hx.+HTN     The patient presents with chest pain.  The onset was 2 days ago.  The course/duration of symptoms is episodic.  Location: Left chest. Radiating pain: none. The character of symptoms is dull.  The degree at onset was minimal.  The degree at maximum was moderate, only with certain movements and deep breaths.  The degree at present is minimal.  There are exacerbating factors including movement and breathing.  The relieving factor is rest.  Risk factors consist of gerd, htn. asthma.  Prior episodes: none.  Associated symptoms: none, denies shortness of breath.  Additional history: none.            Review of patient's allergies indicates:  No Known Allergies  Past Medical History:   Diagnosis Date    Arthritis     Asthma     Cancer     GERD (gastroesophageal reflux disease)     Hypertension      Past Surgical History:   Procedure Laterality Date    PROSTATECTOMY      removal of muscles in forehead       Family History   Problem Relation Age of Onset    Cancer Mother     Cancer Father      Social History     Tobacco Use    Smoking status: Heavy Smoker     Current packs/day: 0.50     Types: Cigarettes    Smokeless tobacco: Never   Substance Use Topics    Alcohol use: Yes     Alcohol/week: 2.0 standard drinks of alcohol     Types: 2 Cans of beer per week     Comment: weekend    Drug use: Never     Review of Systems   Constitutional:  Negative for fever.   HENT:  Negative for sore throat.    Respiratory:  Negative for shortness of breath.    Cardiovascular:  Positive for chest pain.   Gastrointestinal:  Negative for nausea.   Genitourinary:  Negative for dysuria.   Musculoskeletal:  Negative for back pain.   Skin:  Negative for rash.   Neurological:  Negative for weakness.   Hematological:  Does not bruise/bleed easily.   All other systems  reviewed and are negative.      Physical Exam     Initial Vitals [11/15/23 1034]   BP Pulse Resp Temp SpO2   (!) 182/111 87 18 98.1 °F (36.7 °C) 100 %      MAP       --         Physical Exam    Nursing note and vitals reviewed.  Constitutional: He appears well-developed and well-nourished.   HENT:   Head: Normocephalic and atraumatic.   Neck: Neck supple.   Normal range of motion.  Cardiovascular:  Normal rate, regular rhythm, normal heart sounds and intact distal pulses.           Pulmonary/Chest: Breath sounds normal.   Mod ttp left upper chest, pain is reproducible   Abdominal: Abdomen is soft. Bowel sounds are normal.   Musculoskeletal:         General: Normal range of motion.      Cervical back: Normal range of motion and neck supple.     Neurological: He is alert and oriented to person, place, and time. He has normal strength.   Skin: Skin is warm and dry.   Psychiatric: He has a normal mood and affect.         ED Course   Procedures  Labs Reviewed   COMPREHENSIVE METABOLIC PANEL - Abnormal; Notable for the following components:       Result Value    Chloride 111 (*)     Carbon Dioxide 22 (*)     All other components within normal limits   CBC WITH DIFFERENTIAL - Abnormal; Notable for the following components:    MCHC 32.2 (*)     MPV 12.7 (*)     IPF 17.6 (*)     All other components within normal limits   B-TYPE NATRIURETIC PEPTIDE - Normal   MAGNESIUM - Normal   TROPONIN I - Normal   COVID/FLU A&B PCR - Normal    Narrative:     The Xpert Xpress SARS-CoV-2/FLU/RSV plus is a rapid, multiplexed real-time PCR test intended for the simultaneous qualitative detection and differentiation of SARS-CoV-2, Influenza A, Influenza B, and respiratory syncytial virus (RSV) viral RNA in either nasopharyngeal swab or nasal swab specimens.         CBC W/ AUTO DIFFERENTIAL    Narrative:     The following orders were created for panel order CBC Auto Differential.  Procedure                               Abnormality          Status                     ---------                               -----------         ------                     CBC with Differential[[id number]]        Abnormal            Final result                 Please view results for these tests on the individual orders.   EXTRA TUBES    Narrative:     The following orders were created for panel order EXTRA TUBES.  Procedure                               Abnormality         Status                     ---------                               -----------         ------                     Light Blue Top Hold[[id number]]                              Final result               Gold Top Hold[[id number]]                                    Final result               Pink Top Hold[[id number]]                                    Final result                 Please view results for these tests on the individual orders.   LIGHT BLUE TOP HOLD   GOLD TOP HOLD   PINK TOP HOLD     EKG Readings: (Independently Interpreted)   Initial Reading: No STEMI. Rhythm: Normal Sinus Rhythm. Heart Rate: 62. Ectopy: No Ectopy. Conduction: Normal. Other Impression: nonspecific twave/st abn   Reviewed by Dr Novoa (ER staff)     ECG Results              EKG 12-lead (In process)  Result time 11/15/23 12:07:34      In process by Interface, Lab In [facility] (11/15/23 12:07:34)                   Narrative:    Test Reason : R07.9,    Vent. Rate : 062 BPM     Atrial Rate : 062 BPM     P-R Int : 148 ms          QRS Dur : 080 ms      QT Int : 440 ms       P-R-T Axes : 055 -23 -47 degrees     QTc Int : 446 ms    Normal sinus rhythm  ST and T wave abnormality, consider inferolateral ischemia  Abnormal ECG  When compared with ECG of 28-OCT-2021 15:10,  Inverted T waves have replaced nonspecific T wave abnormality in Lateral  leads    Referred By: [email or web address]   SELF           Confirmed By:                                   Imaging Results              X-Ray Chest 1 View (Final result)  Result time 11/15/23 12:36:00       Final result by Kulwinder Bedolla MD (11/15/23 12:36:00)                   Impression:      No acute findings.      Electronically signed by: Kulwinder Bedolla  Date:    11/15/2023  Time:    12:36               Narrative:    EXAMINATION:  XR CHEST 1 VIEW    CLINICAL HISTORY:  chest pain;    COMPARISON:  28 October 2021    FINDINGS:  Portable frontal view of the chest was obtained. The heart is not enlarged.  Lungs are grossly clear.  There is no pneumothorax or significant effusion.                                       Medications - No data to display  Medical Decision Making  The patient presents with chest pain.  The onset was 2 days ago.  The course/duration of symptoms is episodic.  Location: Left chest. Radiating pain: none. The character of symptoms is dull.  The degree at onset was minimal.  The degree at maximum was moderate, only with certain movements and deep breaths.  The degree at present is minimal.  There are exacerbating factors including movement and breathing.  The relieving factor is rest.  Risk factors consist of gerd, htn. asthma.  Prior episodes: none.  Associated symptoms: none, denies shortness of breath.  Additional history: none.        1:13 PM DISPOSITION: The patient is resting comfortably in no acute distress.  He is hemodynamically stable and is without objective evidence for acute process requiring urgent intervention or hospitalization. I provided counseling to patient with regard to condition, the treatment plan, specific conditions for return, and the importance of follow up. Detailed written and verbal instructions provided to patient and he expressed a verbal understanding of the discharge instructions and overall management plan. Reiterated the importance of medication administration and safety and advised patient to follow up with primary care provider in 3-5 days or sooner if needed.  Answered questions at this time. The patient is stable for discharge.       Amount and/or  Complexity of Data Reviewed  Labs:  Decision-making details documented in ED Course.  Radiology:  Decision-making details documented in ED Course.      Additional MDM:   Differential Diagnosis:   Chest wall pain, myocardial infarction, pneumothorax, aortic dissection, pulmonary emboli, pneumonia, among others              ED Course as of 11/15/23 1314   Wed Nov 15, 2023   1300 X-Ray Chest 1 View     Impression:     No acute findings.   [RB]   1300 BNP: 22.2 [RB]   1300 Troponin I: <0.010 [RB]      ED Course User Index  [RB] Akhil Dover ACNP                    Clinical Impression:   Final diagnoses:  [R07.89] Chest wall pain (Primary)        ED Disposition Condition    Discharge Stable          ED Prescriptions       Medication Sig Dispense Start Date End Date Auth. Provider    diclofenac (VOLTAREN) 75 MG EC tablet Take 1 tablet (75 mg total) by mouth 2 (two) times daily as needed (pain). 20 tablet 11/15/2023 -- Akhil Dover ACNP    tiZANidine (ZANAFLEX) 4 MG tablet Take 2 tablets (8 mg total) by mouth every 6 (six) hours as needed (pain or spasms). May cause drowsiness 30 tablet 11/15/2023 11/25/2023 Akhil Dover ACNP          Follow-up Information       Follow up With Specialties Details Why Contact Ama Mejia FNP Nurse Practitioner In 3 days  [address] [facility] [address]  [phone]      [facility] Dept Emergency Medicine  If symptoms worsen [address] [facility] [address] [phone]             Akhil Dover ACNP  11/15/23 4403

## 2023-11-15 NOTE — FIRST PROVIDER EVALUATION
Medical screening examination initiated.  I have conducted a focused provider triage encounter, findings are as follows:    Brief history of present illness:  L sided CP x 2 days    There were no vitals filed for this visit.    Pertinent physical exam:  Hypertensive. NAD    Brief workup plan:  labs, EKG, CXR ordered    Preliminary workup initiated; this workup will be continued and followed by the physician or advanced practice provider that is assigned to the patient when roomed.

## 2023-12-05 ENCOUNTER — HOSPITAL ENCOUNTER (EMERGENCY)
Facility: HOSPITAL | Age: 63
Discharge: HOME | End: 2023-12-05
Attending: INTERNAL MEDICINE
Payer: MEDICARE

## 2023-12-05 VITALS
DIASTOLIC BLOOD PRESSURE: 90 MMHG | HEIGHT: 68 IN | OXYGEN SATURATION: 97 % | HEART RATE: 63 BPM | RESPIRATION RATE: 20 BRPM | WEIGHT: 171.5 LBS | TEMPERATURE: 99 F | BODY MASS INDEX: 25.99 KG/M2 | SYSTOLIC BLOOD PRESSURE: 151 MMHG

## 2023-12-05 DIAGNOSIS — M25.521 RIGHT ELBOW PAIN: ICD-10-CM

## 2023-12-05 DIAGNOSIS — S16.1XXA STRAIN OF NECK MUSCLE, INITIAL ENCOUNTER: ICD-10-CM
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
## 2023-12-05 DIAGNOSIS — I10 PRIMARY HYPERTENSION: ICD-10-CM

## 2023-12-05 PROCEDURE — 25000003 PHARM REV CODE 250: Performed by: PHYSICIAN ASSISTANT

## 2023-12-05 PROCEDURE — 99284 EMERGENCY DEPT VISIT MOD MDM: CPT

## 2023-12-05 RX ORDER — CYCLOBENZAPRINE HCL 10 MG
10 TABLET ORAL 3 TIMES DAILY PRN
Qty: 15 TABLET | Refills: 0 | Status: SHIPPED | OUTPATIENT
Start: 2023-12-05 | End: 2023-12-10

## 2023-12-05 RX ORDER — HYDROCODONE BITARTRATE AND ACETAMINOPHEN 7.5; 325 MG/1; MG/1
1 TABLET ORAL ONCE
Status: COMPLETED | OUTPATIENT
Start: 2023-12-05 | End: 2023-12-05

## 2023-12-05 RX ORDER — AMLODIPINE BESYLATE 5 MG/1
5 TABLET ORAL DAILY
Qty: 90 TABLET | Refills: 3 | Status: SHIPPED | OUTPATIENT
Start: 2023-12-05 | End: 2023-12-19 | Stop reason: SDUPTHER

## 2023-12-05 RX ORDER — CLONIDINE HYDROCHLORIDE 0.1 MG/1
0.1 TABLET ORAL
Status: COMPLETED | OUTPATIENT
Start: 2023-12-05 | End: 2023-12-05

## 2023-12-05 RX ADMIN — HYDROCODONE BITARTRATE AND ACETAMINOPHEN 1 TABLET: 7.5; 325 TABLET ORAL at 06:12

## 2023-12-05 RX ADMIN — CLONIDINE HYDROCHLORIDE 0.1 MG: 0.1 TABLET ORAL at 06:12
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
## 2023-12-06 NOTE — DISCHARGE INSTRUCTIONS
Report to Emergency Department if symptoms return or worsen; [facility] Within 1 to 2 days, It is important that you follow up with your primary care provider or specialist if indicated for further evaluation, workup, and treatment as necessary. The exam and treatment you received in Emergency Department was for an urgent problem and NOT INTENDED AS COMPLETE CARE. It is important that you FOLLOW UP with a doctor for ongoing care. If your symptoms become WORSE or you DO NOT IMPROVE and you are unable to reach your health care provider, you should RETURN to the Emergency Department. The Emergency Department provider has provided a PRELIMINARY INTERPRETATION of all your studies. A final interpretation may be done after you are discharged. If a change in your diagnosis or treatment is needed WE WILL CONTACT YOU. It is critical that we have a CURRENT PHONE NUMBER FOR YOU.

## 2023-12-19 ENCOUNTER — OFFICE VISIT (OUTPATIENT)
Dept: INTERNAL MEDICINE | Facility: CLINIC | Age: 63
End: 2023-12-19
Payer: MEDICARE

## 2023-12-19 VITALS
SYSTOLIC BLOOD PRESSURE: 131 MMHG | RESPIRATION RATE: 16 BRPM | BODY MASS INDEX: 26.37 KG/M2 | WEIGHT: 174 LBS | HEART RATE: 81 BPM | HEIGHT: 68 IN | DIASTOLIC BLOOD PRESSURE: 86 MMHG | TEMPERATURE: 98 F

## 2023-12-19 DIAGNOSIS — E55.9 VITAMIN D DEFICIENCY: Primary | ICD-10-CM

## 2023-12-19 DIAGNOSIS — K21.9 GASTROESOPHAGEAL REFLUX DISEASE WITHOUT ESOPHAGITIS: ICD-10-CM

## 2023-12-19 DIAGNOSIS — F17.210 CIGARETTE NICOTINE DEPENDENCE WITHOUT COMPLICATION: ICD-10-CM

## 2023-12-19 DIAGNOSIS — Z13.9 ENCOUNTER FOR SCREENING INVOLVING SOCIAL DETERMINANTS OF HEALTH (SDOH): ICD-10-CM

## 2023-12-19 DIAGNOSIS — J44.9 CHRONIC OBSTRUCTIVE PULMONARY DISEASE, UNSPECIFIED COPD TYPE: ICD-10-CM

## 2023-12-19 DIAGNOSIS — J45.20 MILD INTERMITTENT ASTHMA WITHOUT COMPLICATION: ICD-10-CM

## 2023-12-19 DIAGNOSIS — I10 PRIMARY HYPERTENSION: ICD-10-CM

## 2023-12-19 PROBLEM — R97.20 HIGH PROSTATE SPECIFIC ANTIGEN (PSA): Status: RESOLVED | Noted: 2022-10-26 | Resolved: 2023-12-19

## 2023-12-19 PROBLEM — C61 PROSTATE CANCER: Status: RESOLVED | Noted: 2022-08-08 | Resolved: 2023-12-19

## 2023-12-19 PROCEDURE — 4010F ACE/ARB THERAPY RXD/TAKEN: CPT | Mod: CPTII,,, | Performed by: NURSE PRACTITIONER

## 2023-12-19 PROCEDURE — 3079F PR MOST RECENT DIASTOLIC BLOOD PRESSURE 80-89 MM HG: ICD-10-PCS | Mod: CPTII,,, | Performed by: NURSE PRACTITIONER

## 2023-12-19 PROCEDURE — 3079F DIAST BP 80-89 MM HG: CPT | Mod: CPTII,,, | Performed by: NURSE PRACTITIONER

## 2023-12-19 PROCEDURE — 3075F SYST BP GE 130 - 139MM HG: CPT | Mod: CPTII,,, | Performed by: NURSE PRACTITIONER

## 2023-12-19 PROCEDURE — 1160F PR REVIEW ALL MEDS BY PRESCRIBER/CLIN PHARMACIST DOCUMENTED: ICD-10-PCS | Mod: CPTII,,, | Performed by: NURSE PRACTITIONER

## 2023-12-19 PROCEDURE — 99214 OFFICE O/P EST MOD 30 MIN: CPT | Mod: PBBFAC | Performed by: NURSE PRACTITIONER

## 2023-12-19 PROCEDURE — 3008F PR BODY MASS INDEX (BMI) DOCUMENTED: ICD-10-PCS | Mod: CPTII,,, | Performed by: NURSE PRACTITIONER

## 2023-12-19 PROCEDURE — 4010F PR ACE/ARB THEARPY RXD/TAKEN: ICD-10-PCS | Mod: CPTII,,, | Performed by: NURSE PRACTITIONER

## 2023-12-19 PROCEDURE — 3008F BODY MASS INDEX DOCD: CPT | Mod: CPTII,,, | Performed by: NURSE PRACTITIONER

## 2023-12-19 PROCEDURE — 1159F PR MEDICATION LIST DOCUMENTED IN MEDICAL RECORD: ICD-10-PCS | Mod: CPTII,,, | Performed by: NURSE PRACTITIONER

## 2023-12-19 PROCEDURE — 1159F MED LIST DOCD IN RCRD: CPT | Mod: CPTII,,, | Performed by: NURSE PRACTITIONER

## 2023-12-19 PROCEDURE — 1160F RVW MEDS BY RX/DR IN RCRD: CPT | Mod: CPTII,,, | Performed by: NURSE PRACTITIONER

## 2023-12-19 PROCEDURE — 99214 OFFICE O/P EST MOD 30 MIN: CPT | Mod: S$PBB,,, | Performed by: NURSE PRACTITIONER

## 2023-12-19 PROCEDURE — 99214 PR OFFICE/OUTPT VISIT, EST, LEVL IV, 30-39 MIN: ICD-10-PCS | Mod: S$PBB,,, | Performed by: NURSE PRACTITIONER

## 2023-12-19 PROCEDURE — 3075F PR MOST RECENT SYSTOLIC BLOOD PRESS GE 130-139MM HG: ICD-10-PCS | Mod: CPTII,,, | Performed by: NURSE PRACTITIONER

## 2023-12-19 RX ORDER — DICLOFENAC SODIUM 75 MG/1
75 TABLET, DELAYED RELEASE ORAL 2 TIMES DAILY PRN
Qty: 30 TABLET | Refills: 6 | Status: SHIPPED | OUTPATIENT
Start: 2023-12-19 | End: 2024-01-17 | Stop reason: SDUPTHER

## 2023-12-19 RX ORDER — FLUTICASONE PROPIONATE AND SALMETEROL 50; 250 UG/1; UG/1
1 POWDER RESPIRATORY (INHALATION) 2 TIMES DAILY
Qty: 180 EACH | Refills: 3 | Status: SHIPPED | OUTPATIENT
Start: 2023-12-19 | End: 2024-12-13

## 2023-12-19 RX ORDER — FLUTICASONE PROPIONATE 50 MCG
1 SPRAY, SUSPENSION (ML) NASAL DAILY
Qty: 16 G | Refills: 6 | Status: SHIPPED | OUTPATIENT
Start: 2023-12-19 | End: 2024-01-17 | Stop reason: SDUPTHER

## 2023-12-19 RX ORDER — HYDROCHLOROTHIAZIDE 25 MG/1
25 TABLET ORAL DAILY
Qty: 90 TABLET | Refills: 0 | Status: SHIPPED | OUTPATIENT
Start: 2023-12-19 | End: 2024-01-17 | Stop reason: SDUPTHER

## 2023-12-19 RX ORDER — OMEPRAZOLE 40 MG/1
40 CAPSULE, DELAYED RELEASE ORAL DAILY
Qty: 90 CAPSULE | Refills: 3 | Status: SHIPPED | OUTPATIENT
Start: 2023-12-19 | End: 2024-12-18

## 2023-12-19 RX ORDER — CETIRIZINE HYDROCHLORIDE 10 MG/1
10 TABLET ORAL DAILY
Qty: 90 TABLET | Refills: 3 | Status: SHIPPED | OUTPATIENT
Start: 2023-12-19 | End: 2024-12-18

## 2023-12-19 RX ORDER — ALBUTEROL SULFATE 90 UG/1
AEROSOL, METERED RESPIRATORY (INHALATION)
COMMUNITY
Start: 2023-10-20 | End: 2024-01-17 | Stop reason: SDUPTHER

## 2023-12-19 RX ORDER — ALBUTEROL SULFATE 0.83 MG/ML
2.5 SOLUTION RESPIRATORY (INHALATION)
Qty: 25 EACH | Refills: 3 | Status: SHIPPED | OUTPATIENT
Start: 2023-12-19 | End: 2024-12-18

## 2023-12-19 RX ORDER — AMLODIPINE BESYLATE 5 MG/1
5 TABLET ORAL DAILY
Qty: 90 TABLET | Refills: 3 | Status: SHIPPED | OUTPATIENT
Start: 2023-12-19 | End: 2024-12-18

## 2023-12-19 NOTE — ASSESSMENT & PLAN NOTE
Continue RX amlodipine 5 mg daily and hctz 25 mg daily  1 month f/u     BP: 131/86 At Goal  Low Sodium Diet (Dash Diet - less than 2 grams of sodium per day).  Maintain healthy weight with goal BMI <30. Exercise 30 minutes per day 5 days per week.

## 2023-12-19 NOTE — ASSESSMENT & PLAN NOTE
Stable  Continue RX omeprazole 40 mg daily  Avoid spicy, acidic, fried foods and alcohol.  Eat 2-3 hours before going to bed.  Avoid tight clothing, chew food thoroughly.  Reduce caffeine intake, avoid soda.  Stressed importance of Smoking/Tobacco Cessation.

## 2023-12-19 NOTE — ASSESSMENT & PLAN NOTE
Controlled  Continue RX Advair Discuks 250/50 and albuterol prn  Use inhalers as prescribed (rinse mouth after use of steroid inhalers). Use long term inhalers daily and rescue inhaler as needed.  Avoid triggers (high humidity, strong odors, chemical fumes).  Report signs of upper respiratory infection immediately for early treatment.  Flu shot recommended yearly.  Practice abdominal breathing. Eat smaller, more frequent meals.  Smoking Cessation encouraged.

## 2023-12-19 NOTE — ASSESSMENT & PLAN NOTE
Lab Results   Component Value Date    [id number] 35.3 11/06/2023     Educated on increasing foods high in Vitamin D such as fish oil, cod liver oil, salmon, milk fortified with vitamin D.  Vitamin D 2000 I.U. tablets daily (purchase over the counter).  Repeat Vitamin D level as ordered.

## 2023-12-19 NOTE — PROGRESS NOTES
ABDOULAYE Snell   [facility]  [address] [facility] [address]      PATIENT NAME: Chacorta Sims  : 1960  DATE: 23  MRN: [id number]      Patient PCP Information       Provider PCP Type    ABDOULAYE Snell General            Reason for Visit / Chief Complaint: Health Maintenance (Lab review )       History of Present Illness / Problem Focused Workflow     Chacorta Sims presents to the clinic with Health Maintenance (Lab review )     64 yo AAM here for f/u. PMH includes prostate cancer (dx 2019), Followed by Dr. Rudolph at [facility] urology clinic. 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 17 ppy hx.  Drinks 12 pack/beer every few days.      Prostate Cancer Screening - 23 PSA <0.1   Colon Cancer Screening - 22 Cologuard Negative  Osteoporosis Screening - 23 Vit D 35.3  Lung Cancer Screening - NA     2023  Pt here today for annual OV with labs completed; reviewed and discussed with no questions or concerns at this time. Meds reviewed and refilled appropriately. Pt without meds today in clinic, chart review shows back in April that the pt went to the ED and lisinopril/hctz was stopped due to angioedema. The pt was started on hctz 50 mg daily, the rx sent was 12.5 mg tabs noting 4 tablets daily, however the pt reports he is only taking one tablet at this time. We will refill hctz 25 mg daily today for pt, we will f/u in 1 month or BP check and medication review with patient. Denies other issues today. HM topics reviewed. Pt seen in [facility] last year Urology, hno f/u noted, will call [facility] Urology clinic today to confirm v/u.               Review of Systems     Review of Systems   Constitutional: Negative.    HENT: Negative.    "  Eyes: Negative.    Respiratory: Negative.     Cardiovascular: Negative.    Gastrointestinal: Negative.    Endocrine: Negative.    Genitourinary: Negative.    Neurological: Negative.    Psychiatric/Behavioral: Negative.           Medications and Allergies     Medications  Current Outpatient Medications   Medication Instructions    ADVAIR DISKUS 250-50 mcg/dose diskus inhaler 1 puff, Inhalation, 2 times daily, Maintenance Inhaler    albuterol (PROVENTIL) 2.5 mg, Nebulization, Every 4-6 hours PRN, Rescue    albuterol (PROVENTIL/VENTOLIN HFA) 90 mcg/actuation inhaler SMARTSI-2 Puff(s) Via Inhaler Every 4-6 Hours PRN    amLODIPine (NORVASC) 5 mg, Oral, Daily, For High Blood Pressure    calcium carbonate (OS-BRENT) 600 mg, Oral    cetirizine (ZYRTEC) 10 mg, Oral, Daily, For allergies / asthma    diclofenac (VOLTAREN) 75 mg, Oral, 2 times daily PRN    fluticasone propionate (FLONASE) 50 mcg, Each Nostril, Daily, For Allergies    hydroCHLOROthiazide (HYDRODIURIL) 25 mg, Oral, Daily, For High Blood Pressure    omeprazole (PRILOSEC) 40 mg, Oral, Daily, For Acid Reflux    sildenafiL (VIAGRA) 100 mg, Oral         Allergies  Review of patient's allergies indicates:   Allergen Reactions    Lisinopril Swelling    Minocycline Rash       Physical Examination     Visit Vitals  /86   Pulse 81   Temp 98.3 °F (36.8 °C)   Resp 16   Ht 5' 8" (1.727 m)   Wt 78.9 kg (174 lb)   BMI 26.46 kg/m²       Physical Exam  Vitals reviewed.   Constitutional:       Appearance: Normal appearance. He is normal weight.   HENT:      Head: Normocephalic.   Cardiovascular:      Rate and Rhythm: Normal rate and regular rhythm.      Pulses: Normal pulses.      Heart sounds: Normal heart sounds.   Pulmonary:      Effort: Pulmonary effort is normal.      Breath sounds: Normal breath sounds.   Abdominal:      General: Abdomen is flat.      Palpations: Abdomen is soft.   Musculoskeletal:         General: Normal range of motion.      Cervical back: Normal " range of motion.   Skin:     General: Skin is warm and dry.   Neurological:      Mental Status: He is alert.   Psychiatric:         Mood and Affect: Mood normal.           Results     Lab Results   Component Value Date    WBC 10.34 11/15/2023    RBC 5.22 11/15/2023    HGB 14.7 11/15/2023    HCT 45.6 11/15/2023    MCV 87.4 11/15/2023    MCH 28.2 11/15/2023    MCHC 32.2 (L) 11/15/2023    RDW 15.7 11/15/2023     11/15/2023    MPV 12.7 (H) 11/15/2023    GRAN 61.0 10/28/2021    LYMPH 27.0 10/28/2021    MONO 5.0 10/28/2021    EOSINOPHIL 4.0 10/28/2021    BASOPHIL 3.0 (H) 10/28/2021     CMP  Sodium   Date Value Ref Range Status   10/28/2021 140 136 - 145 mmol/L Final     Sodium Level   Date Value Ref Range Status   11/15/2023 142 136 - 145 mmol/L Final     Potassium   Date Value Ref Range Status   10/28/2021 4.1 3.5 - 5.1 mmol/L Final     Potassium Level   Date Value Ref Range Status   11/15/2023 4.0 3.5 - 5.1 mmol/L Final     Chloride   Date Value Ref Range Status   10/28/2021 106 95 - 110 mmol/L Final     CO2   Date Value Ref Range Status   10/28/2021 25 23 - 29 mmol/L Final     Carbon Dioxide   Date Value Ref Range Status   11/15/2023 22 (L) 23 - 31 mmol/L Final     Glucose   Date Value Ref Range Status   10/28/2021 85 70 - 110 mg/dL Final     BUN   Date Value Ref Range Status   10/28/2021 15 6 - 20 mg/dL Final     Blood Urea Nitrogen   Date Value Ref Range Status   11/15/2023 15.8 8.4 - 25.7 mg/dL Final     Creatinine   Date Value Ref Range Status   11/15/2023 1.00 0.73 - 1.18 mg/dL Final   10/28/2021 1.2 0.5 - 1.4 mg/dL Final     Calcium   Date Value Ref Range Status   10/28/2021 9.9 8.7 - 10.5 mg/dL Final     Calcium Level Total   Date Value Ref Range Status   11/15/2023 8.8 8.8 - 10.0 mg/dL Final     Total Protein   Date Value Ref Range Status   10/28/2021 7.8 6.0 - 8.4 g/dL Final     Albumin   Date Value Ref Range Status   10/28/2021 4.2 3.5 - 5.2 g/dL Final     Albumin Level   Date Value Ref Range Status  "  11/15/2023 3.5 3.4 - 4.8 g/dL Final     Total Bilirubin   Date Value Ref Range Status   10/28/2021 0.5 0.1 - 1.0 mg/dL Final     Comment:     For infants and newborns, interpretation of results should be based  on gestational age, weight and in agreement with clinical  observations.    Premature Infant recommended reference ranges:  Up to 24 hours.............<8.0 mg/dL  Up to 48 hours............<12.0 mg/dL  3-5 days..................<15.0 mg/dL  6-29 days.................<15.0 mg/dL       Bilirubin Total   Date Value Ref Range Status   11/15/2023 0.2 <=1.5 mg/dL Final     Alkaline Phosphatase   Date Value Ref Range Status   11/15/2023 71 40 - 150 unit/L Final   10/28/2021 74 55 - 135 U/L Final     AST   Date Value Ref Range Status   10/28/2021 16 10 - 40 U/L Final     Aspartate Aminotransferase   Date Value Ref Range Status   11/15/2023 13 5 - 34 unit/L Final     ALT   Date Value Ref Range Status   10/28/2021 18 10 - 44 U/L Final     Alanine Aminotransferase   Date Value Ref Range Status   11/15/2023 13 0 - 55 unit/L Final     Anion Gap   Date Value Ref Range Status   10/28/2021 9 8 - 16 mmol/L Final     eGFR if    Date Value Ref Range Status   10/28/2021 >60 >60 mL/min/1.73 m^2 Final     eGFR if non    Date Value Ref Range Status   10/28/2021 >60 >60 mL/min/1.73 m^2 Final     Comment:     Calculation used to obtain the estimated glomerular filtration  rate (eGFR) is the CKD-EPI equation.        Lab Results   Component Value Date    CHOL 154 11/06/2023     Lab Results   Component Value Date    HDL 43 11/06/2023     No results found for: "LDLCALC"  Lab Results   Component Value Date    TRIG 109 11/06/2023     No results found for: "CHOLHDL"  Lab Results   Component Value Date    TSH 1.738 11/06/2023         Assessment        ICD-10-CM ICD-9-CM   1. Vitamin D deficiency  E55.9 268.9   2. Chronic obstructive pulmonary disease, unspecified COPD type  J44.9 496   3. Mild intermittent " asthma without complication  J45.20 493.90   4. Primary hypertension  I10 401.9   5. Gastroesophageal reflux disease without esophagitis  K21.9 530.81   6. Cigarette nicotine dependence without complication  F17.210 305.1   7. Encounter for screening involving social determinants of health (SDoH)  Z13.9 V82.9        Plan      Problem List Items Addressed This Visit          Pulmonary    Chronic obstructive pulmonary disease    Current Assessment & Plan     Controlled  Continue RX Advair Discuks 250/50 and albuterol prn  Use inhalers as prescribed (rinse mouth after use of steroid inhalers). Use long term inhalers daily and rescue inhaler as needed.  Avoid triggers (high humidity, strong odors, chemical fumes).  Report signs of upper respiratory infection immediately for early treatment.  Flu shot recommended yearly.  Practice abdominal breathing. Eat smaller, more frequent meals.  Smoking Cessation encouraged.          Relevant Medications    ADVAIR DISKUS 250-50 mcg/dose diskus inhaler    albuterol (PROVENTIL) 2.5 mg /3 mL (0.083 %) nebulizer solution    cetirizine (ZYRTEC) 10 MG tablet    Mild intermittent asthma without complication    Current Assessment & Plan     Controlled  Continue RX Advair Discuks 250/50 and albuterol prn  Use inhalers as prescribed (rinse mouth after use of steroid inhalers). Use long term inhalers daily and rescue inhaler as needed.  Avoid triggers (high humidity, strong odors, chemical fumes).  Report signs of upper respiratory infection immediately for early treatment.  Flu shot recommended yearly.  Practice abdominal breathing. Eat smaller, more frequent meals.  Smoking Cessation encouraged.           Relevant Medications    ADVAIR DISKUS 250-50 mcg/dose diskus inhaler    albuterol (PROVENTIL) 2.5 mg /3 mL (0.083 %) nebulizer solution    cetirizine (ZYRTEC) 10 MG tablet    fluticasone propionate (FLONASE) 50 mcg/actuation nasal spray       Cardiac/Vascular    Primary hypertension     Current Assessment & Plan     Continue RX amlodipine 5 mg daily and hctz 25 mg daily  1 month f/u     BP: 131/86 At Goal  Low Sodium Diet (Dash Diet - less than 2 grams of sodium per day).  Maintain healthy weight with goal BMI <30. Exercise 30 minutes per day 5 days per week.           Relevant Medications    amLODIPine (NORVASC) 5 MG tablet    hydroCHLOROthiazide (HYDRODIURIL) 25 MG tablet       Endocrine    Vitamin D deficiency - Primary    Current Assessment & Plan     Lab Results   Component Value Date    OUEGUWDS69DH 35.3 11/06/2023   Educated on increasing foods high in Vitamin D such as fish oil, cod liver oil, salmon, milk fortified with vitamin D.  Vitamin D 2000 I.U. tablets daily (purchase over the counter).  Repeat Vitamin D level as ordered.              GI    Gastroesophageal reflux disease without esophagitis    Current Assessment & Plan     Stable  Continue RX omeprazole 40 mg daily  Avoid spicy, acidic, fried foods and alcohol.  Eat 2-3 hours before going to bed.  Avoid tight clothing, chew food thoroughly.  Reduce caffeine intake, avoid soda.  Stressed importance of Smoking/Tobacco Cessation.           Relevant Medications    omeprazole (PRILOSEC) 40 MG capsule       Other    Cigarette nicotine dependence without complication    Current Assessment & Plan     Dangers of cigarette smoking were reviewed with patient in detail. Patient was Counseled for 3-10 minutes. Nicotine replacement options were discussed. Nicotine replacement was discussed- not prescribed per patient's request          Other Visit Diagnoses       Encounter for screening involving social determinants of health (SDoH)        Relevant Orders    Ambulatory referral/consult to Outpatient Case Management            Future Appointments   Date Time Provider Department Center   1/17/2024  7:00 AM Ama Ramirez FNP [facility]        Follow up in about 1 month (around 1/19/2024) for BP Check .      Signature:     [facility]  [address][1] [id number]    Date of encounter: 12/19/23

## 2024-01-05 ENCOUNTER — PATIENT OUTREACH (OUTPATIENT)
Dept: ADMINISTRATIVE | Facility: OTHER | Age: 64
End: 2024-01-05
Payer: MEDICARE

## 2024-01-09 NOTE — PROGRESS NOTES
LPN spoke to patient/caregiver as per OPCM referral for: Assistance with housing/food    Does the patient consent to completing the assessment/enrollment: Yes  Does the patient consent for LPN to speak to a caregiver? No    Health Insurance Coverage:     Does the patient have adequate health insurance coverage? Yes  Education provided: No    PCP Follow-Up Appointments:    Does the patient have a primary care provider? yes - Ama Ramirez NP  Date of last PCP appointment? 12/19/23  Next PCP appointment:  1/17/24  Was patient provided with education surrounding PCP services/creating a medical home? no      Specialist Appointments:     Does the patient have a pending specialist referral? no  Does the patient have an upcoming specialist appointment? no  Is the patient pregnant? N/A  Does the patient have a mental health provider? no       Home Medications:     Reviewed medication list with patient? No  Is the patient able to afford their medications? Yes    Care Gaps:     Does the patient have any care gaps that are due?       Recent lab results:  Blood Sugar:    Provided education: No  Blood Pressure:   Provided education: Yes        Social Determinants of Health (SDOH)    Patient's identified areas of need: food/utility resources      Education/Resources provided:    Referring to [facility] for resources      Home Health/DME:    Current Home Health: No  Patient has all healthcare equipment/supplies indicated: yes  Current DME:  BP monitor    Additional Documentation:   Spoke to patient based on OPCM referral from pcp for food/housing assistance. States housing is good for now, could use help with food/utility. Will refer to [facility] for this, pt permission given. Per PCP note, pt seen last year in [place] urology and needs f/u at [facility]. Will contact clinic, pt states he has not heard anything and no appt noted in system. States will attend f/u with pcp next week. Pt states taking meds as prescribed including  inhaler. Checks BP, occ high. Encouraged to keep a log. ED precautions discussed. Will f/u in 1 week.      Follow up:   Patient agrees to scheduled follow up call.  Yes

## 2024-01-10 ENCOUNTER — PATIENT OUTREACH (OUTPATIENT)
Dept: ADMINISTRATIVE | Facility: OTHER | Age: 64
End: 2024-01-10
Payer: MEDICARE

## 2024-01-10 NOTE — PROGRESS NOTES
CHW - Follow Up    This Community Health Worker completed a follow up visit with patient via telephone today.  Pt/Caregiver reported:   Community Health Worker provided: Let pt know that I did speak with [facility] urology clinic in [place], he can get another appt but pt must call himself. Provided number of clinic, pt verbalized understanding.   Follow up required: yes, to f/u on [facility] referral.   Follow-up Outreach - Due: 1/24/2024

## 2024-01-17 ENCOUNTER — OFFICE VISIT (OUTPATIENT)
Dept: INTERNAL MEDICINE | Facility: CLINIC | Age: 64
End: 2024-01-17
Payer: MEDICARE

## 2024-01-17 VITALS — SYSTOLIC BLOOD PRESSURE: 114 MMHG | DIASTOLIC BLOOD PRESSURE: 66 MMHG

## 2024-01-17 DIAGNOSIS — J44.9 CHRONIC OBSTRUCTIVE PULMONARY DISEASE, UNSPECIFIED COPD TYPE: ICD-10-CM

## 2024-01-17 DIAGNOSIS — F17.210 CIGARETTE NICOTINE DEPENDENCE WITHOUT COMPLICATION: ICD-10-CM

## 2024-01-17 DIAGNOSIS — Z12.5 PROSTATE CANCER SCREENING: ICD-10-CM

## 2024-01-17 DIAGNOSIS — E55.9 VITAMIN D DEFICIENCY: ICD-10-CM

## 2024-01-17 DIAGNOSIS — J45.20 MILD INTERMITTENT ASTHMA WITHOUT COMPLICATION: ICD-10-CM

## 2024-01-17 DIAGNOSIS — I10 PRIMARY HYPERTENSION: Primary | ICD-10-CM

## 2024-01-17 PROCEDURE — 1160F RVW MEDS BY RX/DR IN RCRD: CPT | Mod: CPTII,95,, | Performed by: NURSE PRACTITIONER

## 2024-01-17 PROCEDURE — 99406 BEHAV CHNG SMOKING 3-10 MIN: CPT | Mod: 95,,, | Performed by: NURSE PRACTITIONER

## 2024-01-17 PROCEDURE — 99214 OFFICE O/P EST MOD 30 MIN: CPT | Mod: 25,95,, | Performed by: NURSE PRACTITIONER

## 2024-01-17 PROCEDURE — 3078F DIAST BP <80 MM HG: CPT | Mod: CPTII,95,, | Performed by: NURSE PRACTITIONER

## 2024-01-17 PROCEDURE — 1159F MED LIST DOCD IN RCRD: CPT | Mod: CPTII,95,, | Performed by: NURSE PRACTITIONER

## 2024-01-17 PROCEDURE — 3074F SYST BP LT 130 MM HG: CPT | Mod: CPTII,95,, | Performed by: NURSE PRACTITIONER

## 2024-01-17 RX ORDER — FLUTICASONE PROPIONATE 50 MCG
1 SPRAY, SUSPENSION (ML) NASAL DAILY
Qty: 16 G | Refills: 6 | Status: SHIPPED | OUTPATIENT
Start: 2024-01-17 | End: 2024-07-15

## 2024-01-17 RX ORDER — HYDROCHLOROTHIAZIDE 25 MG/1
25 TABLET ORAL DAILY
Qty: 90 TABLET | Refills: 3 | Status: SHIPPED | OUTPATIENT
Start: 2024-01-17 | End: 2025-01-16

## 2024-01-17 RX ORDER — DICLOFENAC SODIUM 75 MG/1
75 TABLET, DELAYED RELEASE ORAL 2 TIMES DAILY PRN
Qty: 30 TABLET | Refills: 6 | Status: SHIPPED | OUTPATIENT
Start: 2024-01-17 | End: 2024-05-10

## 2024-01-17 RX ORDER — ALBUTEROL SULFATE 90 UG/1
1-2 AEROSOL, METERED RESPIRATORY (INHALATION)
Qty: 18 G | Refills: 11 | Status: SHIPPED | OUTPATIENT
Start: 2024-01-17 | End: 2025-01-16

## 2024-01-17 NOTE — ASSESSMENT & PLAN NOTE
Controlled / Stable  Continue RX Advair BID / Albuterol neb/ inhaler prn   Use inhalers as prescribed (rinse mouth after use of steroid inhalers). Use long term inhalers daily and rescue inhaler as needed.  Avoid triggers (high humidity, strong odors, chemical fumes).  Report signs of upper respiratory infection immediately for early treatment.  Flu shot recommended yearly.  Practice abdominal breathing. Eat smaller, more frequent meals.  Smoking Cessation encouraged.

## 2024-01-17 NOTE — PROGRESS NOTES
ABDOULAYE Snell   [facility]  [address] [facility] [address]      PATIENT NAME: Chacorta Sims  : 1960  DATE: 24  MRN: [id number]      Patient PCP Information       Provider PCP Type    ABDOULAYE Snell General            Reason for Visit / Chief Complaint: Hypertension (Medication review / blood pressure today is 114/66 ... Patient needs albuterol inhaler )       History of Present Illness / Problem Focused Workflow     Chacorta Sims presents to the clinic with Hypertension (Medication review / blood pressure today is 114/66 ... Patient needs albuterol inhaler )     64 yo AAM here for f/u. Medical problems includes prostate cancer (dx 2019), Followed by Dr. Rudolph at [facility] urology clinic. 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 17 ppy hx.  Drinks 12 pack/beer every few days.      Prostate Cancer Screening - 23 PSA <0.1   Colon Cancer Screening - 22 Cologuard Negative  Osteoporosis Screening - 23 Vit D 35.3  Lung Cancer Screening - NA     2023  Pt here today for annual OV with labs completed; reviewed and discussed with no questions or concerns at this time. Meds reviewed and refilled appropriately. Pt without meds today in clinic, chart review shows back in April that the pt went to the ED and lisinopril/hctz was stopped due to angioedema. The pt was started on hctz 50 mg daily, the rx sent was 12.5 mg tabs noting 4 tablets daily, however the pt reports he is only taking one tablet at this time. We will refill hctz 25 mg daily today for pt, we will f/u in 1 month or BP check and medication review with patient. Denies other issues today. HM topics reviewed. Pt seen in [facility] last year Urology, hno f/u noted,  will call [facility] Urology clinic today to confirm v/u.     01/17/2024  Pt here today for routine f/u for BP check, pt reports BP at home 114/66 at goal, reports compliance with meds. Meds reviewed and refilled appropriately.   Will f/u in Dec 2024 for annual OV with labs. Pt denies any acute issues today.   Denies chest pain, shortness of breath, cough, fever, headache, dizziness, weakness, abdominal pain, nausea, vomiting, diarrhea, constipation, black/bloody stools, unplanned weight loss, night sweats, changes in urinary patterns, burning/odor with urination, depression, anxiety, and SI/HI.             Review of Systems     Review of Systems   Constitutional: Negative.    HENT: Negative.     Eyes: Negative.    Respiratory: Negative.     Cardiovascular: Negative.    Gastrointestinal: Negative.    Endocrine: Negative.    Genitourinary: Negative.    Neurological: Negative.    Psychiatric/Behavioral: Negative.           Medications and Allergies     Medications  Current Outpatient Medications   Medication Instructions    ADVAIR DISKUS 250-50 mcg/dose diskus inhaler 1 puff, Inhalation, 2 times daily, Maintenance Inhaler    albuterol (PROVENTIL) 2.5 mg, Nebulization, Every 4-6 hours PRN, Rescue    albuterol (PROVENTIL/VENTOLIN HFA) 90 mcg/actuation inhaler 1-2 puffs, Inhalation, Every 4-6 hours PRN, Rescue    amLODIPine (NORVASC) 5 mg, Oral, Daily, For High Blood Pressure    calcium carbonate (OS-BRENT) 600 mg, Oral    cetirizine (ZYRTEC) 10 mg, Oral, Daily, For allergies / asthma    diclofenac (VOLTAREN) 75 mg, Oral, 2 times daily PRN    fluticasone propionate (FLONASE) 50 mcg, Each Nostril, Daily, For Allergies    hydroCHLOROthiazide (HYDRODIURIL) 25 mg, Oral, Daily, For High Blood Pressure    omeprazole (PRILOSEC) 40 mg, Oral, Daily, For Acid Reflux    UNABLE TO FIND 2 tablets, Oral, Daily,  medication name: Red boost for erectile dysfunction         Allergies  Review of patient's allergies indicates:   Allergen Reactions     Lisinopril Swelling    Minocycline Rash       Physical Examination     Visit Vitals  /66       Physical Exam  HENT:      Right Ear: Hearing normal.      Left Ear: Hearing normal.   Neurological:      Mental Status: He is alert and oriented to person, place, and time.   Psychiatric:         Mood and Affect: Mood normal.           Results     Lab Results   Component Value Date    WBC 10.34 11/15/2023    RBC 5.22 11/15/2023    HGB 14.7 11/15/2023    HCT 45.6 11/15/2023    MCV 87.4 11/15/2023    MCH 28.2 11/15/2023    MCHC 32.2 (L) 11/15/2023    RDW 15.7 11/15/2023     11/15/2023    MPV 12.7 (H) 11/15/2023    GRAN 61.0 10/28/2021    LYMPH 27.0 10/28/2021    MONO 5.0 10/28/2021    EOSINOPHIL 4.0 10/28/2021    BASOPHIL 3.0 (H) 10/28/2021     CMP  Sodium   Date Value Ref Range Status   10/28/2021 140 136 - 145 mmol/L Final     Sodium Level   Date Value Ref Range Status   11/15/2023 142 136 - 145 mmol/L Final     Potassium   Date Value Ref Range Status   10/28/2021 4.1 3.5 - 5.1 mmol/L Final     Potassium Level   Date Value Ref Range Status   11/15/2023 4.0 3.5 - 5.1 mmol/L Final     Chloride   Date Value Ref Range Status   10/28/2021 106 95 - 110 mmol/L Final     CO2   Date Value Ref Range Status   10/28/2021 25 23 - 29 mmol/L Final     Carbon Dioxide   Date Value Ref Range Status   11/15/2023 22 (L) 23 - 31 mmol/L Final     Glucose   Date Value Ref Range Status   10/28/2021 85 70 - 110 mg/dL Final     BUN   Date Value Ref Range Status   10/28/2021 15 6 - 20 mg/dL Final     Blood Urea Nitrogen   Date Value Ref Range Status   11/15/2023 15.8 8.4 - 25.7 mg/dL Final     Creatinine   Date Value Ref Range Status   11/15/2023 1.00 0.73 - 1.18 mg/dL Final   10/28/2021 1.2 0.5 - 1.4 mg/dL Final     Calcium   Date Value Ref Range Status   10/28/2021 9.9 8.7 - 10.5 mg/dL Final     Calcium Level Total   Date Value Ref Range Status   11/15/2023 8.8 8.8 - 10.0 mg/dL Final     Total Protein   Date Value Ref Range Status    10/28/2021 7.8 6.0 - 8.4 g/dL Final     Albumin   Date Value Ref Range Status   10/28/2021 4.2 3.5 - 5.2 g/dL Final     Albumin Level   Date Value Ref Range Status   11/15/2023 3.5 3.4 - 4.8 g/dL Final     Total Bilirubin   Date Value Ref Range Status   10/28/2021 0.5 0.1 - 1.0 mg/dL Final     Comment:     For infants and newborns, interpretation of results should be based  on gestational age, weight and in agreement with clinical  observations.    Premature Infant recommended reference ranges:  Up to 24 hours.............<8.0 mg/dL  Up to 48 hours............<12.0 mg/dL  3-5 days..................<15.0 mg/dL  6-29 days.................<15.0 mg/dL       Bilirubin Total   Date Value Ref Range Status   11/15/2023 0.2 <=1.5 mg/dL Final     Alkaline Phosphatase   Date Value Ref Range Status   11/15/2023 71 40 - 150 unit/L Final   10/28/2021 74 55 - 135 U/L Final     AST   Date Value Ref Range Status   10/28/2021 16 10 - 40 U/L Final     Aspartate Aminotransferase   Date Value Ref Range Status   11/15/2023 13 5 - 34 unit/L Final     ALT   Date Value Ref Range Status   10/28/2021 18 10 - 44 U/L Final     Alanine Aminotransferase   Date Value Ref Range Status   11/15/2023 13 0 - 55 unit/L Final     Anion Gap   Date Value Ref Range Status   10/28/2021 9 8 - 16 mmol/L Final     eGFR if    Date Value Ref Range Status   10/28/2021 >60 >60 mL/min/1.73 m^2 Final     eGFR if non    Date Value Ref Range Status   10/28/2021 >60 >60 mL/min/1.73 m^2 Final     Comment:     Calculation used to obtain the estimated glomerular filtration  rate (eGFR) is the CKD-EPI equation.        Lab Results   Component Value Date    CHOL 154 11/06/2023     Lab Results   Component Value Date    HDL 43 11/06/2023     Lab Results   Component Value Date    TRIG 109 11/06/2023     Lab Results   Component Value Date    VLDL 22 11/06/2023     Lab Results   Component Value Date    LDL 89.00 11/06/2023     Lab Results    Component Value Date    TSH 1.738 11/06/2023         Assessment        ICD-10-CM ICD-9-CM   1. Primary hypertension  I10 401.9   2. Prostate cancer screening  Z12.5 V76.44   3. Mild intermittent asthma without complication  J45.20 493.90   4. Chronic obstructive pulmonary disease, unspecified COPD type  J44.9 496   5. Vitamin D deficiency  E55.9 268.9   6. Cigarette nicotine dependence without complication  F17.210 305.1        Plan      Problem List Items Addressed This Visit          Pulmonary    Chronic obstructive pulmonary disease    Current Assessment & Plan     Controlled / Stable  Continue RX Advair BID / Albuterol neb/ inhaler prn   Use inhalers as prescribed (rinse mouth after use of steroid inhalers). Use long term inhalers daily and rescue inhaler as needed.  Avoid triggers (high humidity, strong odors, chemical fumes).  Report signs of upper respiratory infection immediately for early treatment.  Flu shot recommended yearly.  Practice abdominal breathing. Eat smaller, more frequent meals.  Smoking Cessation encouraged.         Relevant Medications    albuterol (PROVENTIL/VENTOLIN HFA) 90 mcg/actuation inhaler    Mild intermittent asthma without complication    Current Assessment & Plan     Controlled / Stable  Continue RX Advair BID / Albuterol neb/ inhaler prn   Use inhalers as prescribed (rinse mouth after use of steroid inhalers). Use long term inhalers daily and rescue inhaler as needed.  Avoid triggers (high humidity, strong odors, chemical fumes).  Report signs of upper respiratory infection immediately for early treatment.  Flu shot recommended yearly.  Practice abdominal breathing. Eat smaller, more frequent meals.  Smoking Cessation encouraged.           Relevant Medications    albuterol (PROVENTIL/VENTOLIN HFA) 90 mcg/actuation inhaler    fluticasone propionate (FLONASE) 50 mcg/actuation nasal spray       Cardiac/Vascular    Primary hypertension - Primary    Current Assessment & Plan      Continue RX amlodipine 5 mg daily and hctz 25 mg daily   BP at goal  BP: 114/66 (given by patient)  Low Sodium Diet (Dash Diet - less than 2 grams of sodium per day).  Maintain healthy weight with goal BMI <30. Exercise 30 minutes per day 5 days per week.           Relevant Medications    hydroCHLOROthiazide (HYDRODIURIL) 25 MG tablet    Other Relevant Orders    TSH    Comprehensive Metabolic Panel    Urinalysis, Reflex to Urine Culture    Lipid Panel    CBC Auto Differential       Endocrine    Vitamin D deficiency    Relevant Orders    Vitamin D       Other    Cigarette nicotine dependence without complication    Current Assessment & Plan     Dangers of cigarette smoking were reviewed with patient in detail. Patient was Counseled for 3-10 minutes. Nicotine replacement options were discussed. Nicotine replacement was discussed- not prescribed per patient's request          Other Visit Diagnoses       Prostate cancer screening        Relevant Orders    PSA, Screening            No future appointments.     Follow up in about 11 months (around 12/9/2024) for Annual OV.      Signature:     [facility]  [address] [facility] [address]    Date of encounter: 1/17/24    Audio Only Telehealth Visit     The patient location is: home  The chief complaint leading to consultation is: BP Check   Visit type: Virtual visit with audio only (telephone)  Total time spent with patient: 15     The reason for the audio only service rather than synchronous audio and video virtual visit was related to technical difficulties or patient preference/necessity.     Each patient to whom I provide medical services by telemedicine is:  (1) informed of the relationship between the physician and patient and the respective role of any other health care provider with respect to management of the patient; and (2) notified that they may decline to receive medical services by telemedicine  and may withdraw from such care at any time. Patient verbally consented to receive this service via voice-only telephone call.       This service was not originating from a related E/M service provided within the previous 7 days nor will  to an E/M service or procedure within the next 24 hours or my soonest available appointment.  Prevailing standard of care was able to be met in this audio-only visit.

## 2024-01-17 NOTE — ASSESSMENT & PLAN NOTE
Continue RX amlodipine 5 mg daily and hctz 25 mg daily   BP at goal  BP: 114/66 (given by patient)  Low Sodium Diet (Dash Diet - less than 2 grams of sodium per day).  Maintain healthy weight with goal BMI <30. Exercise 30 minutes per day 5 days per week.

## 2024-01-24 ENCOUNTER — PATIENT OUTREACH (OUTPATIENT)
Dept: ADMINISTRATIVE | Facility: OTHER | Age: 64
End: 2024-01-24
Payer: MEDICARE

## 2024-01-31 NOTE — PROGRESS NOTES
CHW - Case Closure    This Community Health Worker spoke to patient via telephone today.   Pt/Caregiver reported: Pt states he heard from [place], they gave him info on [facility], he has not contacted yet. I asked if he needed anything else and he only expressed problems with ED> I encouraged him to call [facility] urology clinic to discuss this when them and get appt. He has the number. Next pcp appt 12/24.  Pt/Caregiver denied any additional needs at this time and agrees with episode closure at this time.  Provided patient with Community Health Worker's contact information and encouraged him/her to contact this Community Health Worker if additional needs arise.

## 2024-05-10 ENCOUNTER — HOSPITAL ENCOUNTER (EMERGENCY)
Facility: HOSPITAL | Age: 64
Discharge: HOME | End: 2024-05-10
Attending: EMERGENCY MEDICINE
Payer: MEDICARE

## 2024-05-10 VITALS
SYSTOLIC BLOOD PRESSURE: 165 MMHG | OXYGEN SATURATION: 98 % | RESPIRATION RATE: 18 BRPM | BODY MASS INDEX: 25.18 KG/M2 | TEMPERATURE: 98 F | HEART RATE: 64 BPM | DIASTOLIC BLOOD PRESSURE: 96 MMHG | WEIGHT: 170 LBS | HEIGHT: 69 IN

## 2024-05-10 DIAGNOSIS — R52 PAIN: ICD-10-CM

## 2024-05-10 DIAGNOSIS — M25.461 EFFUSION OF BURSA OF RIGHT KNEE: Primary | ICD-10-CM

## 2024-05-10 PROCEDURE — 25000003 PHARM REV CODE 250: Performed by: NURSE PRACTITIONER

## 2024-05-10 PROCEDURE — 99284 EMERGENCY DEPT VISIT MOD MDM: CPT | Mod: 25

## 2024-05-10 PROCEDURE — 63600175 PHARM REV CODE 636 W HCPCS: Performed by: NURSE PRACTITIONER

## 2024-05-10 PROCEDURE — 96372 THER/PROPH/DIAG INJ SC/IM: CPT | Performed by: NURSE PRACTITIONER

## 2024-05-10 RX ORDER — LIDOCAINE HYDROCHLORIDE 10 MG/ML
10 INJECTION, SOLUTION EPIDURAL; INFILTRATION; INTRACAUDAL; PERINEURAL
Status: COMPLETED | OUTPATIENT
Start: 2024-05-10 | End: 2024-05-10

## 2024-05-10 RX ORDER — DEXAMETHASONE SODIUM PHOSPHATE 4 MG/ML
4 INJECTION, SOLUTION INTRA-ARTICULAR; INTRALESIONAL; INTRAMUSCULAR; INTRAVENOUS; SOFT TISSUE
Status: COMPLETED | OUTPATIENT
Start: 2024-05-10 | End: 2024-05-10

## 2024-05-10 RX ORDER — DICLOFENAC SODIUM 75 MG/1
75 TABLET, DELAYED RELEASE ORAL 2 TIMES DAILY PRN
Qty: 30 TABLET | Refills: 1 | Status: SHIPPED | OUTPATIENT
Start: 2024-05-10 | End: 2024-11-06

## 2024-05-10 RX ADMIN — LIDOCAINE HYDROCHLORIDE 20 MG: 10 INJECTION, SOLUTION EPIDURAL; INFILTRATION; INTRACAUDAL; PERINEURAL at 06:05

## 2024-05-10 RX ADMIN — DEXAMETHASONE SODIUM PHOSPHATE 4 MG: 4 INJECTION, SOLUTION INTRA-ARTICULAR; INTRALESIONAL; INTRAMUSCULAR; INTRAVENOUS; SOFT TISSUE at 06:05

## 2024-05-10 NOTE — ED PROVIDER NOTES
Encounter Date: 5/10/2024       History     Chief Complaint   Patient presents with    Knee Pain     Right knee pain and swelling x1 week. No injury.      Right knee pain and swelling x1 week. He stated he has a history of arthritis and it flares up from time to time. Worse with movement. Steroid injections help.     The history is provided by the patient. No  was used.     Review of patient's allergies indicates:   Allergen Reactions    Lisinopril Swelling    Minocycline Rash     Past Medical History:   Diagnosis Date    Arthritis     Asthma     Cancer     GERD (gastroesophageal reflux disease)     Hypertension      Past Surgical History:   Procedure Laterality Date    PROSTATECTOMY      removal of muscles in forehead       Family History   Problem Relation Name Age of Onset    Cancer Mother Madeline Sims     Cancer Father Jovon Sims      Social History     Tobacco Use    Smoking status: Heavy Smoker     Current packs/day: 0.50     Average packs/day: 0.5 packs/day for 35.4 years (17.7 ttl pk-yrs)     Types: Cigarettes     Start date: 1989    Smokeless tobacco: Never   Substance Use Topics    Alcohol use: Yes     Alcohol/week: 2.0 standard drinks of alcohol     Types: 2 Cans of beer per week     Comment: weekend    Drug use: Never     Review of Systems   Constitutional:  Negative for fever.   HENT:  Negative for sore throat.    Respiratory:  Negative for shortness of breath.    Cardiovascular:  Negative for chest pain.   Gastrointestinal:  Negative for nausea.   Genitourinary:  Negative for dysuria.   Musculoskeletal:  Positive for arthralgias. Negative for back pain.   Skin:  Negative for rash.   Neurological:  Negative for weakness.   Hematological:  Does not bruise/bleed easily.       Physical Exam     Initial Vitals [05/10/24 1657]   BP Pulse Resp Temp SpO2   (!) 165/96 64 18 97.5 °F (36.4 °C) 98 %      MAP       --         Physical Exam    Nursing note and vitals  reviewed.  Constitutional: He appears well-developed and well-nourished. No distress.   HENT:   Head: Normocephalic and atraumatic.   Eyes: Pupils are equal, round, and reactive to light.   Neck: Neck supple.   Normal range of motion.  Cardiovascular:  Normal rate, regular rhythm, normal heart sounds and intact distal pulses.           Pulmonary/Chest: Breath sounds normal.   Abdominal: Abdomen is soft. Bowel sounds are normal.   Musculoskeletal:      Cervical back: Normal range of motion and neck supple.      Right knee: Swelling, deformity, effusion and bony tenderness present. Decreased range of motion. Tenderness present over the medial joint line, lateral joint line, MCL, LCL, ACL, PCL and patellar tendon.      Left knee: Normal.     Neurological: He is alert and oriented to person, place, and time. He has normal strength. GCS score is 15. GCS eye subscore is 4. GCS verbal subscore is 5. GCS motor subscore is 6.   Skin: Skin is warm and dry.   Psychiatric: Thought content normal.         ED Course   Arthrocentesis    Date/Time: 5/10/2024 6:20 PM  Location procedure was performed: [facility] EMERGENCY DEPARTMENT    Performed by: Minerva Gil FNP  Authorized by: Minerva Gil FNP  Assisting provider: Minerva Gil NP  Pre-op diagnosis: right knee effusion  Post-op diagnosis: right knee effusion  Consent Done: Not Needed  Indications: joint swelling   Body area: knee  Joint: right knee  Local anesthesia used: no    Anesthesia:  Local anesthesia used: no    Patient sedated: no  Description of findings: serous fluid aspirate   Needle size: 20 G  Approach: medial  Aspirate amount: 20 mL  Aspirate: serous  Betamethasone amount: 4 mg  Lidocaine 1% amount: 2 mL  Patient tolerance: Patient tolerated the procedure well with no immediate complications  Technical procedures used: NA  Significant surgical tasks conducted by the assistant(s): NA  Complications: No  Estimated blood loss (mL): 0  Specimens:  No  Implants: No        Labs Reviewed - No data to display       Imaging Results              X-Ray Knee 3 View Right (Final result)  Result time 05/10/24 17:40:36      Final result by Albina Yoder MD (05/10/24 17:40:36)                   Impression:      Suprapatellar bursa effusion    Some degenerative changes in the tibial spines    Patella is slightly displaced superiorly      Electronically signed by: David Yoder  Date:    05/10/2024  Time:    17:40               Narrative:    EXAMINATION:  XR KNEE 3 VIEW RIGHT    CLINICAL HISTORY:  Pain, unspecified    TECHNIQUE:  AP, lateral, and Merchant views of the right knee were performed.    COMPARISON:  03/15/2021    FINDINGS:  The bones and joints are in good anatomic alignment with no evidence of acute fracture or dislocation is seen.  There is some prominence of the tibial spines consistent with mild degenerative change.  The patella is intact.  It is slightly displaced superiorly.  There is a suprapatellar bursa effusion.                                    X-Rays:   Independently Interpreted Readings:   Other Readings:  Right knee effusion with superior patella displacement    Medications   dexAMETHasone injection 4 mg (4 mg Intramuscular Given 5/10/24 1820)   LIDOcaine (PF) 10 mg/ml (1%) injection 100 mg (20 mg Infiltration Given 5/10/24 1821)     Medical Decision Making  Right knee pain and swelling. Aspiration of 20mL serous fluid from right knee. Injected 4mg betamethasone and 20 mg 1% lidocaine. Improved pain and mobility after procedure. Will send for ortho referral    Amount and/or Complexity of Data Reviewed  Radiology: ordered and independent interpretation performed. Decision-making details documented in ED Course.     Details: Right knee effusion with patella displacement superiorly    Risk  OTC drugs.  Prescription drug management.  Risk Details: Risk Details: Given strict ED return precautions. I have spoken with the patient and/or  caregivers. I have explained the patient's condition, diagnoses and treatment plan based on the information available to me at this time. I have answered the patient's and/or caregiver's questions and addressed any concerns. The patient and/or caregivers have as good an understanding of the patient's diagnosis, condition and treatment plan as can be expected at this point. The vital signs have been stable. The patient's condition is stable and appropriate for discharge from the emergency department.      The patient will pursue further outpatient evaluation with the primary care physician or other designated or consulting physician as outlined in the discharge instructions. The patient and/or caregivers are agreeable to this plan of care and follow-up instructions have been explained in detail. The patient and/or caregivers have received these instructions in written format and have expressed an understanding of the discharge instructions. The patient and/or caregivers are aware that any significant change in condition or worsening of symptoms should prompt an immediate return to this or the closest emergency department or a call to 911.          Additional MDM:   Differential Diagnosis:   Osteoarthritis, trauma, fracture                                It is important that you follow up with your primary care provider or specialist if indicated for further evaluation, workup, and treatment as necessary. The exam and treatment you received in Emergency Department was for an urgent problem and NOT INTENDED AS COMPLETE CARE. It is important that you FOLLOW UP with a doctor for ongoing care. If your symptoms become WORSE or you DO NOT IMPROVE and you are unable to reach your health care provider, you should RETURN to the Emergency Department      Clinical Impression:  Final diagnoses:  [R52] Pain  [M25.461] Effusion of bursa of right knee (Primary)                 Minerva Gil, [facility]  05/10/24 [id number]

## 2024-05-10 NOTE — DISCHARGE INSTRUCTIONS
Risk Details: Given strict ED return precautions. I have spoken with the patient and/or caregivers. I have explained the patient's condition, diagnoses and treatment plan based on the information available to me at this time. I have answered the patient's and/or caregiver's questions and addressed any concerns. The patient and/or caregivers have as good an understanding of the patient's diagnosis, condition and treatment plan as can be expected at this point. The vital signs have been stable. The patient's condition is stable and appropriate for discharge from the emergency department.      The patient will pursue further outpatient evaluation with the primary care physician or other designated or consulting physician as outlined in the discharge instructions. The patient and/or caregivers are agreeable to this plan of care and follow-up instructions have been explained in detail. The patient and/or caregivers have received these instructions in written format and have expressed an understanding of the discharge instructions. The patient and/or caregivers are aware that any significant change in condition or worsening of symptoms should prompt an immediate return to this or the closest emergency department or a call to 911.

## 2025-01-20 ENCOUNTER — HOSPITAL ENCOUNTER (EMERGENCY)
Facility: HOSPITAL | Age: 65
Discharge: HOME | End: 2025-01-20
Attending: EMERGENCY MEDICINE
Payer: MEDICARE

## 2025-01-20 VITALS
BODY MASS INDEX: 24.5 KG/M2 | HEIGHT: 69 IN | RESPIRATION RATE: 18 BRPM | TEMPERATURE: 99 F | HEART RATE: 73 BPM | WEIGHT: 165.44 LBS | DIASTOLIC BLOOD PRESSURE: 76 MMHG | SYSTOLIC BLOOD PRESSURE: 185 MMHG | OXYGEN SATURATION: 99 %

## 2025-01-20 DIAGNOSIS — M79.641 RIGHT HAND PAIN: Primary | ICD-10-CM

## 2025-01-20 PROCEDURE — 96372 THER/PROPH/DIAG INJ SC/IM: CPT | Performed by: NURSE PRACTITIONER

## 2025-01-20 PROCEDURE — 63600175 PHARM REV CODE 636 W HCPCS: Mod: JZ,TB | Performed by: NURSE PRACTITIONER

## 2025-01-20 PROCEDURE — 99284 EMERGENCY DEPT VISIT MOD MDM: CPT | Mod: 25

## 2025-01-20 RX ORDER — DICLOFENAC SODIUM 75 MG/1
75 TABLET, DELAYED RELEASE ORAL 2 TIMES DAILY PRN
Qty: 20 TABLET | Refills: 0 | Status: SHIPPED | OUTPATIENT
Start: 2025-01-20

## 2025-01-20 RX ORDER — KETOROLAC TROMETHAMINE 30 MG/ML
30 INJECTION, SOLUTION INTRAMUSCULAR; INTRAVENOUS
Status: COMPLETED | OUTPATIENT
Start: 2025-01-20 | End: 2025-01-20

## 2025-01-20 RX ADMIN — KETOROLAC TROMETHAMINE 30 MG: 30 INJECTION, SOLUTION INTRAMUSCULAR; INTRAVENOUS at 06:01

## 2025-01-21 NOTE — ED PROVIDER NOTES
Encounter Date: 1/20/2025       History     Chief Complaint   Patient presents with    Hand Pain     Presents from home with c/o increased pain to right hand carpal tunnel with pain now radiating up arm and into back.     The patient presents with right hand pain.  The onset was a month ago increased today.  The course/duration of symptoms is constant.  Type of injury: none known.  Location: right hand. Radiating pain: none. The character of symptoms is pain.  The degree of pain is moderate and with certain movements.  The degree of swelling is mild.  The exacerbating factor is movement.  There are relieving factors including rest and immobilization.  Risk factors consist of none.  Prior episodes: occasional.  Therapy today: none.  Associated symptoms: none.  Additional history: none. He thinks it could be carpal tunnel syndrome.           Review of patient's allergies indicates:   Allergen Reactions    Lisinopril Swelling    Minocycline Rash     Past Medical History:   Diagnosis Date    Arthritis     Asthma     Cancer     GERD (gastroesophageal reflux disease)     Hypertension      Past Surgical History:   Procedure Laterality Date    PROSTATECTOMY      removal of muscles in forehead       Family History   Problem Relation Name Age of Onset    Cancer Mother Madeline Sims     Cancer Father Jovon Sims      Social History     Tobacco Use    Smoking status: Heavy Smoker     Current packs/day: 0.50     Average packs/day: 0.5 packs/day for 36.1 years (18.0 ttl pk-yrs)     Types: Cigarettes     Start date: 1989    Smokeless tobacco: Never   Substance Use Topics    Alcohol use: Yes     Alcohol/week: 2.0 standard drinks of alcohol     Types: 2 Cans of beer per week     Comment: weekend    Drug use: Never     Review of Systems   Constitutional:  Negative for fever.   HENT:  Negative for sore throat.    Respiratory:  Negative for shortness of breath.    Cardiovascular:  Negative for chest pain.    Gastrointestinal:  Negative for nausea.   Genitourinary:  Negative for dysuria.   Musculoskeletal:  Positive for arthralgias. Negative for back pain.   Skin:  Negative for rash.   Neurological:  Negative for weakness.   Hematological:  Does not bruise/bleed easily.   All other systems reviewed and are negative.      Physical Exam     Initial Vitals [01/20/25 1703]   BP Pulse Resp Temp SpO2   (!) 187/104 64 18 98.8 °F (37.1 °C) 96 %      MAP       --         Physical Exam    Nursing note and vitals reviewed.  Constitutional: He appears well-developed and well-nourished.   HENT:   Head: Normocephalic and atraumatic.   Neck: Neck supple.   Normal range of motion.  Cardiovascular:  Normal rate, regular rhythm, normal heart sounds and intact distal pulses.           Pulmonary/Chest: Effort normal and breath sounds normal. He has no decreased breath sounds.   Abdominal: Abdomen is soft and flat. Bowel sounds are normal. There is no abdominal tenderness.   Musculoskeletal:         General: Normal range of motion.      Cervical back: Normal range of motion and neck supple.      Comments: Right Hand: mild ttp without swelling, FROM, brisk cap refill, NVI      Neurological: He is alert and oriented to person, place, and time. He has normal strength.   Skin: Skin is warm and dry.   Psychiatric: He has a normal mood and affect.         ED Course   Procedures  Labs Reviewed - No data to display       Imaging Results              X-Ray Hand 3 view Right (Final result)  Result time 01/20/25 18:22:10      Final result by Dinesh Waldrop MD (01/20/25 18:22:10)                   Impression:      No acute osseous abnormality identified.      Electronically signed by: Dinesh Waldrop  Date:    01/20/2025  Time:    18:22               Narrative:    EXAMINATION:  XR HAND COMPLETE 3 VIEW RIGHT    CLINICAL HISTORY:  right hand pain;    TECHNIQUE:  Three views    COMPARISON:  None available.    FINDINGS:  Articular and osseous structures  are unremarkable.  No acute fracture, dislocation or significant arthritic change.  No soft tissue calcifications identified.                                       Medications   ketorolac injection 30 mg (has no administration in time range)     Medical Decision Making  The patient presents with right hand pain.  The onset was a month ago increased today.  The course/duration of symptoms is constant.  Type of injury: none known.  Location: right hand. Radiating pain: none. The character of symptoms is pain.  The degree of pain is moderate and with certain movements.  The degree of swelling is mild.  The exacerbating factor is movement.  There are relieving factors including rest and immobilization.  Risk factors consist of none.  Prior episodes: occasional.  Therapy today: none.  Associated symptoms: none.  Additional history: none. He thinks it could be carpal tunnel syndrome.       He will f/u with his pcp.6:29 PM DISPOSITION: The patient is resting comfortably in no acute distress.  He is hemodynamically stable and is without objective evidence for acute process requiring urgent intervention or hospitalization. I provided counseling to patient with regard to condition, the treatment plan, specific conditions for return, and the importance of follow up. Detailed written and verbal instructions provided to patient and he expressed a verbal understanding of the discharge instructions and overall management plan. Reiterated the importance of medication administration and safety and advised patient to follow up with primary care provider in 3-5 days or sooner if needed.  Answered questions at this time. The patient is stable for discharge.       Amount and/or Complexity of Data Reviewed  Radiology: ordered. Decision-making details documented in ED Course.    Risk  Prescription drug management.      Additional MDM:   Differential Diagnosis:   At this time differential diagnosis is but not limited to fracture, sprain,  contusion, arthritis, cts             ED Course as of 01/20/25 1833   Mon Jan 20, 2025   1826 X-Ray Hand 3 view Right     Impression:     No acute osseous abnormality identified.   [RB]   1827 Given strict ED return precautions. I have spoken with the patient and/or caregivers. I have explained the patient's condition, diagnoses and treatment plan based on the information available to me at this time. I have answered the patient's and/or caregiver's questions and addressed any concerns. The patient and/or caregivers have as good an understanding of the patient's diagnosis, condition and treatment plan as can be expected at this point. The vital signs have been stable. The patient's condition is stable and appropriate for discharge from the emergency department.      The patient will pursue further outpatient evaluation with the primary care physician or other designated or consulting physician as outlined in the discharge instructions. The patient and/or caregivers are agreeable to this plan of care and follow-up instructions have been explained in detail. The patient and/or caregivers have received these instructions in written format and have expressed an understanding of the discharge instructions. The patient and/or caregivers are aware that any significant change in condition or worsening of symptoms should prompt an immediate return to this or the closest emergency department or a call to 911.      [RB]      ED Course User Index  [RB] Akhil Dover, BILLIE                           Clinical Impression:  Final diagnoses:  [M79.641] Right hand pain (Primary)          ED Disposition Condition    Discharge Stable          ED Prescriptions       Medication Sig Dispense Start Date End Date Auth. Provider    diclofenac (VOLTAREN) 75 MG EC tablet Take 1 tablet (75 mg total) by mouth 2 (two) times daily as needed (pain). Do not take with ibuprofen, motrin, aleve, naprosyn, or any other NSAID. 20 tablet 1/20/2025 -- Stanislaw  BILLIE Lindsay          Follow-up Information       Follow up With Specialties Details Why Contact Info    Ama Ramirez FNP Internal Medicine In 3 days  [address] [facility] [address]  [phone]      [facility] Emergency Medicine  If symptoms worsen [address] [facility] [address] [phone]             Akhil Dover, BILLIE  01/20/25 7816

## 2025-01-28 DIAGNOSIS — K21.9 GASTROESOPHAGEAL REFLUX DISEASE WITHOUT ESOPHAGITIS: ICD-10-CM

## 2025-01-28 RX ORDER — OMEPRAZOLE 40 MG/1
40 CAPSULE, DELAYED RELEASE ORAL DAILY
Qty: 90 CAPSULE | Refills: 0 | Status: SHIPPED | OUTPATIENT
Start: 2025-01-28 | End: 2025-04-28

## 2025-01-28 NOTE — TELEPHONE ENCOUNTER
----- Message from [employer] sent at 1/28/2025 11:52 AM CST -----  .Who Called: Chacorta Johnson    Caller is requesting a sooner appointment. Caller declined first available appointment listed below. Caller will not accept being placed on the waitlist and is requesting a message be sent to doctor.    When is the first available appointment?4/28/25  Options offered (Virtual Visit, Urgent Care): in person   Symptoms:needs an appt for renewing his medication. States that he is out of the medication and can't get refills until he is seen      Preferred Method of Contact: Phone Call  Patient's Preferred Phone Number on File: [phone]  Best Call Back Number, if different:  Additional Information:

## 2025-01-28 NOTE — TELEPHONE ENCOUNTER
LOV 1/17/2024  No show 12/2024  NOV none   Message sent to clerical pool    Norvasc already pended

## 2025-01-28 NOTE — TELEPHONE ENCOUNTER
----- Message from [employer] sent at 1/28/2025 11:52 AM CST -----  .Who Called: Chacorta Johnson    Caller is requesting a sooner appointment. Caller declined first available appointment listed below. Caller will not accept being placed on the waitlist and is requesting a message be sent to doctor.    When is the first available appointment?4/28/25  Options offered (Virtual Visit, Urgent Care): in person   Symptoms:needs an appt for renewing his medication. States that he is out of the medication and can't get refills until he is seen      Preferred Method of Contact: Phone Call  Patient's Preferred Phone Number on File: [phone]  Best Call Back Number, if different:  Additional Information:

## 2025-04-15 ENCOUNTER — HOSPITAL ENCOUNTER (EMERGENCY)
Facility: HOSPITAL | Age: 65
Discharge: HOME | End: 2025-04-15
Attending: INTERNAL MEDICINE
Payer: MEDICARE

## 2025-04-15 VITALS
TEMPERATURE: 98 F | WEIGHT: 180 LBS | SYSTOLIC BLOOD PRESSURE: 151 MMHG | DIASTOLIC BLOOD PRESSURE: 88 MMHG | OXYGEN SATURATION: 96 % | HEIGHT: 69 IN | HEART RATE: 69 BPM | RESPIRATION RATE: 22 BRPM | BODY MASS INDEX: 26.66 KG/M2

## 2025-04-15 DIAGNOSIS — M19.90 OSTEOARTHRITIS, UNSPECIFIED OSTEOARTHRITIS TYPE, UNSPECIFIED SITE: Primary | ICD-10-CM

## 2025-04-15 DIAGNOSIS — J45.20 MILD INTERMITTENT ASTHMA WITHOUT COMPLICATION: ICD-10-CM

## 2025-04-15 DIAGNOSIS — R06.02 SHORTNESS OF BREATH: ICD-10-CM

## 2025-04-15 DIAGNOSIS — J44.9 CHRONIC OBSTRUCTIVE PULMONARY DISEASE, UNSPECIFIED COPD TYPE: ICD-10-CM

## 2025-04-15 LAB
ABS NEUT CALC (OHS): 6.59 X10(3)/MCL (ref 2.1–9.2)
ALBUMIN SERPL-MCNC: 3.9 G/DL (ref 3.4–4.8)
ALBUMIN/GLOB SERPL: 1.2 RATIO (ref 1.1–2)
ALP SERPL-CCNC: 66 UNIT/L (ref 40–150)
ALT SERPL-CCNC: 11 UNIT/L (ref 0–55)
ANION GAP SERPL CALC-SCNC: 8 MEQ/L
AST SERPL-CCNC: 15 UNIT/L (ref 11–45)
BILIRUB SERPL-MCNC: 0.3 MG/DL
BNP BLD-MCNC: 18.8 PG/ML
BUN SERPL-MCNC: 18.9 MG/DL (ref 8.4–25.7)
CALCIUM SERPL-MCNC: 8.4 MG/DL (ref 8.8–10)
CHLORIDE SERPL-SCNC: 109 MMOL/L (ref 98–107)
CO2 SERPL-SCNC: 19 MMOL/L (ref 23–31)
CREAT SERPL-MCNC: 1.03 MG/DL (ref 0.72–1.25)
CREAT/UREA NIT SERPL: 18
EOSINOPHIL NFR BLD MANUAL: 0.18 X10(3)/MCL (ref 0–0.9)
EOSINOPHIL NFR BLD MANUAL: 2 % (ref 0–8)
ERYTHROCYTE [DISTWIDTH] IN BLOOD BY AUTOMATED COUNT: 15.7 % (ref 11.5–17)
ETHANOL SERPL-MCNC: <10 MG/DL
FLUAV AG UPPER RESP QL IA.RAPID: NOT DETECTED
FLUBV AG UPPER RESP QL IA.RAPID: NOT DETECTED
GFR SERPLBLD CREATININE-BSD FMLA CKD-EPI: >60 ML/MIN/1.73/M2
GLOBULIN SER-MCNC: 3.2 GM/DL (ref 2.4–3.5)
GLUCOSE SERPL-MCNC: 94 MG/DL (ref 82–115)
HCT VFR BLD AUTO: 41.9 % (ref 42–52)
HGB BLD-MCNC: 14.6 G/DL (ref 14–18)
LYMPHOCYTES NFR BLD MANUAL: 1.9 X10(3)/MCL (ref 0.6–4.6)
LYMPHOCYTES NFR BLD MANUAL: 21 % (ref 13–40)
MCH RBC QN AUTO: 29.6 PG (ref 27–31)
MCHC RBC AUTO-ENTMCNC: 34.8 G/DL (ref 33–36)
MCV RBC AUTO: 84.8 FL (ref 80–94)
MONOCYTES NFR BLD MANUAL: 0.36 X10(3)/MCL (ref 0.1–1.3)
MONOCYTES NFR BLD MANUAL: 4 % (ref 2–11)
NEUTROPHILS NFR BLD MANUAL: 73 % (ref 47–80)
NRBC BLD AUTO-RTO: 0 %
OHS QRS DURATION: 84 MS
OHS QTC CALCULATION: 434 MS
PLATELET # BLD AUTO: 114 X10(3)/MCL (ref 130–400)
PLATELET # BLD EST: ADEQUATE 10*3/UL
PMV BLD AUTO: 11.4 FL (ref 7.4–10.4)
POTASSIUM SERPL-SCNC: 3.9 MMOL/L (ref 3.5–5.1)
PROT SERPL-MCNC: 7.1 GM/DL (ref 5.8–7.6)
RBC # BLD AUTO: 4.94 X10(6)/MCL (ref 4.7–6.1)
RBC MORPH BLD: NORMAL
SARS-COV-2 RNA RESP QL NAA+PROBE: NEGATIVE
SODIUM SERPL-SCNC: 136 MMOL/L (ref 136–145)
TROPONIN I SERPL-MCNC: <0.01 NG/ML (ref 0–0.04)
WBC # BLD AUTO: 9.03 X10(3)/MCL (ref 4.5–11.5)

## 2025-04-15 PROCEDURE — 0240U COVID/FLU A&B PCR: CPT | Performed by: INTERNAL MEDICINE

## 2025-04-15 PROCEDURE — 82077 ASSAY SPEC XCP UR&BREATH IA: CPT | Performed by: INTERNAL MEDICINE

## 2025-04-15 PROCEDURE — 83880 ASSAY OF NATRIURETIC PEPTIDE: CPT | Performed by: INTERNAL MEDICINE

## 2025-04-15 PROCEDURE — 63600175 PHARM REV CODE 636 W HCPCS: Performed by: INTERNAL MEDICINE

## 2025-04-15 PROCEDURE — 85007 BL SMEAR W/DIFF WBC COUNT: CPT | Performed by: INTERNAL MEDICINE

## 2025-04-15 PROCEDURE — 84484 ASSAY OF TROPONIN QUANT: CPT | Performed by: INTERNAL MEDICINE

## 2025-04-15 PROCEDURE — 94761 N-INVAS EAR/PLS OXIMETRY MLT: CPT

## 2025-04-15 PROCEDURE — 80053 COMPREHEN METABOLIC PANEL: CPT | Performed by: INTERNAL MEDICINE

## 2025-04-15 PROCEDURE — 99285 EMERGENCY DEPT VISIT HI MDM: CPT | Mod: 25

## 2025-04-15 PROCEDURE — 96374 THER/PROPH/DIAG INJ IV PUSH: CPT

## 2025-04-15 PROCEDURE — 93005 ELECTROCARDIOGRAM TRACING: CPT

## 2025-04-15 RX ORDER — DICLOFENAC SODIUM 75 MG/1
75 TABLET, DELAYED RELEASE ORAL 2 TIMES DAILY PRN
Qty: 20 TABLET | Refills: 0 | Status: SHIPPED | OUTPATIENT
Start: 2025-04-15 | End: 2025-05-05

## 2025-04-15 RX ORDER — ALBUTEROL SULFATE 90 UG/1
1-2 INHALANT RESPIRATORY (INHALATION)
Qty: 18 G | Refills: 11 | Status: SHIPPED | OUTPATIENT
Start: 2025-04-15 | End: 2026-04-15

## 2025-04-15 RX ORDER — FLUTICASONE PROPIONATE AND SALMETEROL 50; 250 UG/1; UG/1
1 POWDER RESPIRATORY (INHALATION) 2 TIMES DAILY
Qty: 180 EACH | Refills: 3 | Status: SHIPPED | OUTPATIENT
Start: 2025-04-15 | End: 2026-04-10

## 2025-04-15 RX ORDER — DEXAMETHASONE SODIUM PHOSPHATE 4 MG/ML
8 INJECTION, SOLUTION INTRA-ARTICULAR; INTRALESIONAL; INTRAMUSCULAR; INTRAVENOUS; SOFT TISSUE
Status: COMPLETED | OUTPATIENT
Start: 2025-04-15 | End: 2025-04-15

## 2025-04-15 RX ADMIN — DEXAMETHASONE SODIUM PHOSPHATE 8 MG: 4 INJECTION, SOLUTION INTRA-ARTICULAR; INTRALESIONAL; INTRAMUSCULAR; INTRAVENOUS; SOFT TISSUE at 03:04

## 2025-04-15 NOTE — ED PROVIDER NOTES
Encounter Date: 4/15/2025  History from patient     History     Chief Complaint   Patient presents with    Hand Pain    Cough     HPI    Chacorta Sims is 65 y.o. male who  has a past medical history of Arthritis, Asthma, Cancer, GERD (gastroesophageal reflux disease), and Hypertension. arrives in ER with c/o Hand Pain and Cough    Review of patient's allergies indicates:   Allergen Reactions    Lisinopril Swelling    Minocycline Rash     Past Medical History:   Diagnosis Date    Arthritis     Asthma     Cancer     GERD (gastroesophageal reflux disease)     Hypertension      Past Surgical History:   Procedure Laterality Date    PROSTATECTOMY      removal of muscles in forehead       Family History   Problem Relation Name Age of Onset    Cancer Mother Madeline Sims     Cancer Father Jovon Sims      Social History[1]  Review of Systems   Constitutional:  Negative for fever.   HENT:  Negative for trouble swallowing and voice change.    Eyes:  Negative for visual disturbance.   Respiratory:  Positive for cough and shortness of breath.    Cardiovascular:  Negative for chest pain.   Gastrointestinal:  Negative for abdominal pain, diarrhea and vomiting.   Genitourinary:  Negative for dysuria and hematuria.   Musculoskeletal:  Negative for back pain and gait problem.        Bilateral hand pain, bilateral knee pain, bilateral ankle pain   Skin:  Negative for color change and rash.   Neurological:  Negative for headaches.   Psychiatric/Behavioral:  Negative for behavioral problems and sleep disturbance.    All other systems reviewed and are negative.      Physical Exam     Initial Vitals [04/15/25 0233]   BP Pulse Resp Temp SpO2   (!) 152/90 80 20 98.3 °F (36.8 °C) (!) 94 %      MAP       --         Physical Exam    Nursing note and vitals reviewed.  Constitutional: He appears well-developed and well-nourished. No distress.   HENT:   Head: Atraumatic.   Eyes: EOM are normal.   Neck: Neck supple.    Cardiovascular:  Normal rate, regular rhythm and normal heart sounds.           Pulmonary/Chest: Breath sounds normal. No respiratory distress. He has no wheezes. He has no rhonchi. He has no rales.   Abdominal: Abdomen is soft. Bowel sounds are normal. He exhibits no distension. There is no abdominal tenderness.   Musculoskeletal:         General: No edema. Normal range of motion.      Cervical back: Neck supple. No bony tenderness.      Comments: Tenderness in the joints of hand, bilateral knees, bilateral ankles, but no signs of arthritis/inflammation of the joints     Neurological: He is alert and oriented to person, place, and time. He has normal strength. No cranial nerve deficit. GCS score is 15. GCS eye subscore is 4. GCS verbal subscore is 5. GCS motor subscore is 6.   Speech Normal   Skin: Skin is dry.   Psychiatric: He has a normal mood and affect.   Pleasant         ED Course   Procedures  Orders Placed This Encounter    X-ray Chest AP Portable    Comprehensive metabolic panel    CBC auto differential    Troponin I    Brain natriuretic peptide    CBC with Differential    Urinalysis, Reflex to Urine Culture    Drug Screen, Urine    Ethanol    COVID/FLU A&B PCR    Manual Differential    Diet NPO    Vital signs    Cardiac Monitoring - Adult    Oxygen Continuous    Pulse Oximetry Continuous    EKG 12-LEAD    Insert saline lock    dexAMETHasone injection 8 mg    albuterol (PROVENTIL/VENTOLIN HFA) 90 mcg/actuation inhaler    ADVAIR DISKUS 250-50 mcg/dose diskus inhaler    diclofenac (VOLTAREN) 75 MG EC tablet       Labs Reviewed   COMPREHENSIVE METABOLIC PANEL - Abnormal       Result Value    Sodium 136      Potassium 3.9      Chloride 109 (*)     CO2 19 (*)     Glucose 94      Blood Urea Nitrogen 18.9      Creatinine 1.03      Calcium 8.4 (*)     Protein Total 7.1      Albumin 3.9      Globulin 3.2      Albumin/Globulin Ratio 1.2      Bilirubin Total 0.3      ALP 66      ALT 11      AST 15      eGFR >60       Anion Gap 8.0      BUN/Creatinine Ratio 18     CBC WITH DIFFERENTIAL - Abnormal    WBC 9.03      RBC 4.94      Hgb 14.6      Hct 41.9 (*)     MCV 84.8      MCH 29.6      MCHC 34.8      RDW 15.7      Platelet 114 (*)     MPV 11.4 (*)     NRBC% 0.0     TROPONIN I - Normal    Troponin-I <0.010     B-TYPE NATRIURETIC PEPTIDE - Normal    Natriuretic Peptide 18.8     ALCOHOL,MEDICAL (ETHANOL) - Normal    Ethanol Level <10.0     COVID/FLU A&B PCR - Normal    Influenza A PCR Not Detected      Influenza B PCR Not Detected      SARS-CoV-2 PCR Negative      Narrative:     The Xpert Xpress SARS-CoV-2/FLU/RSV plus is a rapid, multiplexed real-time PCR test intended for the simultaneous qualitative detection and differentiation of SARS-CoV-2, Influenza A, Influenza B, and respiratory syncytial virus (RSV) viral RNA in either nasopharyngeal swab or nasal swab specimens.         MANUAL DIFFERENTIAL - Normal    Neutrophils % 73      Lymphs % 21      Monocytes % 4      Eosinophils % 2      Neutrophils Abs Calc 6.5919      Lymphs Abs 1.8963      Eosinophils Abs 0.1806      Monocytes Abs 0.3612      Platelets Adequate      RBC Morph Normal     CBC W/ AUTO DIFFERENTIAL    Narrative:     The following orders were created for panel order CBC auto differential.  Procedure                               Abnormality         Status                     ---------                               -----------         ------                     CBC with Differential[[id number]]       Abnormal            Final result               Manual Differential[[id number]]         Normal              Final result                 Please view results for these tests on the individual orders.   URINALYSIS, REFLEX TO URINE CULTURE   DRUG SCREEN, URINE (BEAKER)        ECG Results              EKG 12-LEAD (Preliminary result)  Result time 04/15/25 03:42:47      Wet Read by Jose Javier MD (04/15/25 03:42:47, [facility], Emergency  Medicine)    EKG: Independently reviewed and / or Interpreted by Jose Javier MD. independently as Normal Sinus Rhythm, Rate 71, Left Axis Deviation, No STEMI, small anterior Q-waves.  T-wave inversion in V6                                    Imaging Results              X-ray Chest AP Portable (Preliminary result)  Result time 04/15/25 03:39:47      Wet Read by Jose Javier MD (04/15/25 03:39:47, [facility], Emergency Medicine)    Chest One View:  Independently reviewed and/or interpreted by Jose Javier MD.  No Focal Consolidation, No Acute Cardiopulmonary abnormality identified grossly.                                     Medications   dexAMETHasone injection 8 mg (8 mg Intravenous Given 4/15/25 0314)     Medical Decision Making    Chacorta Sims is 65 y.o. male who  has a past medical history of Arthritis, Asthma, Cancer, GERD (gastroesophageal reflux disease), and Hypertension. arrives in ER with c/o Hand Pain and Cough    Patient comes to the emergency room with complaint of bilateral hand pain and cough.  Cough has been going on for 2-3 weeks, in the hand pain has been going on for months and months, he says that his family doctor says that this is arthritis and carpal tunnel, and they are not doing anything about it, so today decided to come to the emergency room to find out what is going on with his bilateral hand pain, he also reports pain in joints of leg which is knees and ankles, he says they just tell him that his arthritis.  He has been having cough, he is out of his bumps, he is still smokes half to 1 pack a day, drinks alcohol on a daily basis, has not done cocaine for a long time but he does do marijuana.    He wants to find out what is going on with his joints that the not getting better for months and months and years and years now, and wants to find out why she is coughing.  I will advise him that 1 thing is very important is to stop smoking cigarette, his  lung sounds are clear in his O2 sat is okay, since his cough has been going on for weeks and weeks, I do not think that he has COVID or flu at this time.    Amount and/or Complexity of Data Reviewed  Labs: ordered.  Radiology: ordered and independent interpretation performed.    Risk  Prescription drug management.               ED Course as of 04/15/25 0429   Tue Apr 15, 2025   0351 Patient's chest x-ray does not show any pneumonia, BNP and troponin is normal, white count is normal, H&H is in normal range, chemistry does not show any major abnormality except for a bicarb of 19.  I will prescribe his usual inhalers, and meloxicam for his chronic hand pain and let him go home. [GQ]      ED Course User Index  [GQ] Jose Javier MD                           Clinical Impression:  Final diagnoses:  [R06.02] Shortness of breath  [J44.9] Chronic obstructive pulmonary disease, unspecified COPD type  [M19.90] Osteoarthritis, unspecified osteoarthritis type, unspecified site (Primary)          ED Disposition Condition    Discharge Stable          ED Prescriptions       Medication Sig Dispense Start Date End Date Auth. Provider    albuterol (PROVENTIL/VENTOLIN HFA) 90 mcg/actuation inhaler Inhale 1-2 puffs into the lungs every 4 to 6 hours as needed for Wheezing. Rescue 18 g 4/15/2025 4/15/2026 Jose Javier MD    ADVAIR DISKUS 250-50 mcg/dose diskus inhaler Inhale 1 puff into the lungs 2 (two) times a day. Maintenance Inhaler 180 each 4/15/2025 4/10/2026 Jose Javier MD    diclofenac (VOLTAREN) 75 MG EC tablet Take 1 tablet (75 mg total) by mouth 2 (two) times daily as needed (pain). Do not take with ibuprofen, motrin, aleve, naprosyn, or any other NSAID. 20 tablet 4/15/2025 5/5/2025 Jose Javier MD          Follow-up Information       Follow up With Specialties Details Why Contact Ama Mejia FNP Internal Medicine In 2 days  [address] [id number] [phone]                  [1]   Social History  Tobacco Use    Smoking status: Heavy Smoker     Current packs/day: 0.50     Average packs/day: 0.5 packs/day for 36.3 years (18.1 ttl pk-yrs)     Types: Cigarettes     Start date: 1989    Smokeless tobacco: Never   Substance Use Topics    Alcohol use: Yes     Alcohol/week: 2.0 standard drinks of alcohol     Types: 2 Cans of beer per week     Comment: weekend    Drug use: Never        Jose Javier MD  04/15/25 0429

## 2025-04-15 NOTE — DISCHARGE INSTRUCTIONS
You need to try to stop smoking as soon as possible    Take medicines as prescribed    See your family doctor in one to 2 days for further evaluation, workup, and treatment as necessary    Avoid driving or operating machinery while taking medicines as some medicines might cause drowsiness and may cause problems. Also pain medicines have potential of being addictive  so use Pain meds specially Narcotics Sparingly.    The exam and treatment you received in Emergency Room was for an urgent problem and NOT INTENDED AS COMPLETE CARE. It is important that you FOLLOW UP with a doctor for ongoing care. If your symptoms become WORSE or you DO NOT IMPROVE and you are unable to reach your health care provider, you should RETURN to the emergency department. The Emergency Room doctor has provided a PRELIMINARY INTERPRETATION of all your STUDIES. A final interpretation may be done after you are discharged. IF A CHANGE in your diagnosis or treatment is needed WE WILL CONTACT YOU. It is critical that we have a CURRENT PHONE NUMBER FOR YOU.

## 2025-04-29 ENCOUNTER — TELEPHONE (OUTPATIENT)
Dept: INTERNAL MEDICINE | Facility: CLINIC | Age: 65
End: 2025-04-29
Payer: MEDICARE

## 2025-04-29 NOTE — TELEPHONE ENCOUNTER
I contacted Patient and advised ED precaution as we do not have an earlier appointment . Patient/ wife voiced understanding.

## 2025-04-29 NOTE — TELEPHONE ENCOUNTER
----- Message from Martina sent at 4/28/2025  2:42 PM CDT -----  .Who Called: dany Victor is requesting assistance/information from provider's office.Symptoms (please be specific): rt hand issues and balance How long has patient had these symptoms:  naList of preferred pharmacies on file (remove unneeded): [unfilled]If different, enter pharmacy into here including location and phone number: naPreferred Method of Contact: Phone CallPatient's Preferred Phone Number on File: [phone] Best Call Back Number, if different:  [phone] Information: pt's spouse wants nurse to give her a call.

## 2025-05-05 ENCOUNTER — HOSPITAL ENCOUNTER (OUTPATIENT)
Dept: RADIOLOGY | Facility: HOSPITAL | Age: 65
Discharge: HOME | End: 2025-05-05
Payer: MEDICARE

## 2025-05-05 DIAGNOSIS — M15.0 PRIMARY OSTEOARTHRITIS INVOLVING MULTIPLE JOINTS: ICD-10-CM

## 2025-05-05 DIAGNOSIS — M54.2 CERVICAL PAIN: ICD-10-CM

## 2025-05-05 DIAGNOSIS — M25.511 RIGHT SHOULDER PAIN: ICD-10-CM

## 2025-05-05 PROCEDURE — 72040 X-RAY EXAM NECK SPINE 2-3 VW: CPT | Mod: TC

## 2025-05-05 PROCEDURE — 73030 X-RAY EXAM OF SHOULDER: CPT | Mod: TC,RT

## 2025-05-05 PROCEDURE — 72070 X-RAY EXAM THORAC SPINE 2VWS: CPT | Mod: TC

## 2025-05-05 PROCEDURE — 72100 X-RAY EXAM L-S SPINE 2/3 VWS: CPT | Mod: TC

## 2025-05-23 DIAGNOSIS — Z85.46 HISTORY OF PROSTATE CANCER: Primary | ICD-10-CM
